# Patient Record
Sex: MALE | Race: BLACK OR AFRICAN AMERICAN | NOT HISPANIC OR LATINO | Employment: FULL TIME | URBAN - METROPOLITAN AREA
[De-identification: names, ages, dates, MRNs, and addresses within clinical notes are randomized per-mention and may not be internally consistent; named-entity substitution may affect disease eponyms.]

---

## 2017-03-03 ENCOUNTER — OFFICE VISIT (OUTPATIENT)
Dept: FAMILY MEDICINE | Facility: CLINIC | Age: 42
End: 2017-03-03
Payer: COMMERCIAL

## 2017-03-03 VITALS
TEMPERATURE: 98 F | WEIGHT: 220 LBS | HEART RATE: 75 BPM | SYSTOLIC BLOOD PRESSURE: 136 MMHG | DIASTOLIC BLOOD PRESSURE: 84 MMHG | OXYGEN SATURATION: 98 %

## 2017-03-03 DIAGNOSIS — Z13.220 SCREENING CHOLESTEROL LEVEL: ICD-10-CM

## 2017-03-03 DIAGNOSIS — R03.0 ELEVATED BLOOD PRESSURE READING WITHOUT DIAGNOSIS OF HYPERTENSION: ICD-10-CM

## 2017-03-03 DIAGNOSIS — Z00.01 ENCOUNTER FOR ROUTINE ADULT HEALTH EXAMINATION WITH ABNORMAL FINDINGS: Primary | ICD-10-CM

## 2017-03-03 DIAGNOSIS — Z23 IMMUNIZATION DUE: ICD-10-CM

## 2017-03-03 DIAGNOSIS — F34.1 DYSTHYMIA: ICD-10-CM

## 2017-03-03 DIAGNOSIS — Z13.1 SCREENING FOR DIABETES MELLITUS: ICD-10-CM

## 2017-03-03 LAB
CHOLEST SERPL-MCNC: 168 MG/DL
GLUCOSE SERPL-MCNC: 92 MG/DL (ref 70–99)

## 2017-03-03 PROCEDURE — 82465 ASSAY BLD/SERUM CHOLESTEROL: CPT | Performed by: PHYSICIAN ASSISTANT

## 2017-03-03 PROCEDURE — 90471 IMMUNIZATION ADMIN: CPT | Performed by: PHYSICIAN ASSISTANT

## 2017-03-03 PROCEDURE — 82947 ASSAY GLUCOSE BLOOD QUANT: CPT | Performed by: PHYSICIAN ASSISTANT

## 2017-03-03 PROCEDURE — 99395 PREV VISIT EST AGE 18-39: CPT | Mod: 25 | Performed by: PHYSICIAN ASSISTANT

## 2017-03-03 PROCEDURE — 90715 TDAP VACCINE 7 YRS/> IM: CPT | Performed by: PHYSICIAN ASSISTANT

## 2017-03-03 PROCEDURE — 36415 COLL VENOUS BLD VENIPUNCTURE: CPT | Performed by: PHYSICIAN ASSISTANT

## 2017-03-03 RX ORDER — CITALOPRAM HYDROBROMIDE 20 MG/1
TABLET ORAL
Qty: 30 TABLET | Refills: 1 | Status: SHIPPED | OUTPATIENT
Start: 2017-03-03 | End: 2017-05-25

## 2017-03-03 ASSESSMENT — ANXIETY QUESTIONNAIRES
7. FEELING AFRAID AS IF SOMETHING AWFUL MIGHT HAPPEN: SEVERAL DAYS
5. BEING SO RESTLESS THAT IT IS HARD TO SIT STILL: NOT AT ALL
6. BECOMING EASILY ANNOYED OR IRRITABLE: SEVERAL DAYS
1. FEELING NERVOUS, ANXIOUS, OR ON EDGE: MORE THAN HALF THE DAYS
3. WORRYING TOO MUCH ABOUT DIFFERENT THINGS: SEVERAL DAYS
2. NOT BEING ABLE TO STOP OR CONTROL WORRYING: SEVERAL DAYS
GAD7 TOTAL SCORE: 8
IF YOU CHECKED OFF ANY PROBLEMS ON THIS QUESTIONNAIRE, HOW DIFFICULT HAVE THESE PROBLEMS MADE IT FOR YOU TO DO YOUR WORK, TAKE CARE OF THINGS AT HOME, OR GET ALONG WITH OTHER PEOPLE: SOMEWHAT DIFFICULT

## 2017-03-03 ASSESSMENT — PATIENT HEALTH QUESTIONNAIRE - PHQ9: 5. POOR APPETITE OR OVEREATING: MORE THAN HALF THE DAYS

## 2017-03-03 NOTE — PROGRESS NOTES
SUBJECTIVE:     CC: Yobany Vieira is an 41 year old male who presents for preventative health visit.     Healthy Habits:    Do you get at least three servings of calcium containing foods daily (dairy, green leafy vegetables, etc.)? yes and no, taking calcium and/or vitamin D supplement: no    Amount of exercise or daily activities, outside of work: 1 day(s) per week    Problems taking medications regularly No    Medication side effects: No    Have you had an eye exam in the past two years? no    Do you see a dentist twice per year? yes    Do you have sleep apnea, excessive snoring or daytime drowsiness?no        Yobany would like a refill on celexa.    Today's PHQ-2 Score:   PHQ-2 ( 1999 Pfizer) 3/3/2017 8/31/2015   Q1: Little interest or pleasure in doing things 0 2   Q2: Feeling down, depressed or hopeless 0 3   PHQ-2 Score 0 5       Abuse: Current or Past(Physical, Sexual or Emotional)- No  Do you feel safe in your environment - Yes    Social History   Substance Use Topics     Smoking status: Current Every Day Smoker     Packs/day: 0.50     Years: 1.00     Smokeless tobacco: Never Used     Alcohol use 0.0 oz/week     0 Standard drinks or equivalent per week      Comment: social use     The patient does not drink >3 drinks per day nor >7 drinks per week.    Last PSA: No results found for: PSA    No results for input(s): CHOL, HDL, LDL, TRIG, CHOLHDLRATIO, NHDL in the last 89285 hours.    Reviewed orders with patient. Reviewed health maintenance and updated orders accordingly - Yes    Reviewed and updated as needed this visit by clinical staff  Tobacco  Allergies  Meds  Problems  Med Hx  Surg Hx  Fam Hx  Soc Hx          Reviewed and updated as needed this visit by Provider  Tobacco  Allergies  Meds  Problems  Med Hx  Surg Hx  Fam Hx  Soc Hx           Past Medical History   Diagnosis Date     Depressive disorder       History reviewed. No pertinent past surgical history.    ROS:  C: NEGATIVE for  fever, chills, change in weight  I: NEGATIVE for worrisome rashes, moles or lesions  E: NEGATIVE for vision changes or irritation  ENT: NEGATIVE for ear, mouth and throat problems  R: NEGATIVE for significant cough or SOB  CV: NEGATIVE for chest pain, palpitations or peripheral edema  GI: NEGATIVE for nausea, abdominal pain, heartburn, or change in bowel habits   male: negative for dysuria, hematuria, decreased urinary stream, erectile dysfunction, urethral discharge  M: NEGATIVE for significant arthralgias or myalgia  N: NEGATIVE for weakness, dizziness or paresthesias  P: NEGATIVE for changes in mood or affect    Problem list, Medication list, Allergies, and Medical/Social/Surgical histories reviewed in [facility] and updated as appropriate.  BP Readings from Last 3 Encounters:   03/03/17 136/84   08/31/15 (!) 134/100    Wt Readings from Last 3 Encounters:   03/03/17 220 lb (99.8 kg)   08/31/15 232 lb 12.8 oz (105.6 kg)                  OBJECTIVE:     /84  Pulse 75  Temp 98  F (36.7  C) (Oral)  Wt 220 lb (99.8 kg)  SpO2 98%  EXAM:  GENERAL: healthy, alert and no distress  EYES: Eyes grossly normal to inspection, PERRL and conjunctivae and sclerae normal  HENT: ear canals and TM's normal, nose and mouth without ulcers or lesions  NECK: no adenopathy, no asymmetry, masses, or scars and thyroid normal to palpation  RESP: lungs clear to auscultation - no rales, rhonchi or wheezes  CV: regular rate and rhythm, normal S1 S2, no S3 or S4, no murmur, click or rub, no peripheral edema and peripheral pulses strong  ABDOMEN: soft, nontender, no hepatosplenomegaly, no masses and bowel sounds normal  MS: no gross musculoskeletal defects noted, no edema  SKIN: no suspicious lesions or rashes  NEURO: Normal strength and tone, mentation intact and speech normal  PSYCH: mentation appears normal, affect normal/bright    ASSESSMENT/PLAN:         ICD-10-CM    1. Encounter for routine adult health examination with abnormal findings  Z00.01    2. Dysthymia F34.1 DEPRESSION ACTION PLAN (DAP)     citalopram (CELEXA) 20 MG tablet   3. Elevated blood pressure reading without diagnosis of hypertension R03.0    4. Screening for diabetes mellitus Z13.1 Glucose   5. Screening cholesterol level Z13.220 Cholesterol   6. Immunization due Z23 TDAP (ADACEL AGES 11-64)       COUNSELING:  Reviewed preventive health counseling, as reflected in patient instructions       Regular exercise       Healthy diet/nutrition         reports that he has been smoking.  He has a 0.50 pack-year smoking history. He has never used smokeless tobacco.  Tobacco Cessation Action Plan: Information offered: Patient not interested at this time  There is no height or weight on file to calculate BMI.   Weight management plan: Discussed healthy diet and exercise guidelines and patient will follow up in 12 months in clinic to re-evaluate.    Counseling Resources:  ATP IV Guidelines  Pooled Cohorts Equation Calculator  FRAX Risk Assessment  ICSI Preventive Guidelines  Dietary Guidelines for Americans, 2010  USDA's MyPlate  ASA Prophylaxis  Lung CA Screening    Hesham Jimenez PA-C  [facility]

## 2017-03-03 NOTE — LETTER
[facility]  [address]  [place] [address]  [phone]        March 13, 2017      Yobany Vieira  [address]  [place] [address]        Dear Yobany,      Your blood sugar and cholesterol numbers came back nice and normal.     Results for orders placed or performed in visit on 03/03/17   Cholesterol   Result Value Ref Range    Cholesterol 168 <200 mg/dL   Glucose   Result Value Ref Range    Glucose 92 70 - 99 mg/dL     If you have any questions or concerns, please call myself or my nurse at [phone].    Sincerely,    Hesham Jimenez PA-C/amrit

## 2017-03-03 NOTE — MR AVS SNAPSHOT
After Visit Summary   3/3/2017    Yobany Vieira    MRN: [id number]           Patient Information     Date Of Birth          1975        Visit Information        Provider Department      3/3/2017 4:20 PM Hesham Jimenez PA-C [facility]        Today's Diagnoses     Encounter for routine adult health examination with abnormal findings    -  1    Dysthymia        Elevated blood pressure reading without diagnosis of hypertension        Screening for diabetes mellitus        Screening cholesterol level        Immunization due          Care Instructions      Preventive Health Recommendations  Male Ages 26 - 39    Yearly exam:             See your health care provider every year in order to  o   Review health changes.   o   Discuss preventive care.    o   Review your medicines if your doctor has prescribed any.    You should be tested each year for STDs (sexually transmitted diseases), if you re at risk.     After age 35, talk to your provider about cholesterol testing. If you are at risk for heart disease, have your cholesterol tested at least every 5 years.     If you are at risk for diabetes, you should have a diabetes test (fasting glucose).  Shots: Get a flu shot each year. Get a tetanus shot every 10 years.     Nutrition:    Eat at least 5 servings of fruits and vegetables daily.     Eat whole-grain bread, whole-wheat pasta and brown rice instead of white grains and rice.     Talk to your provider about Calcium and Vitamin D.     Lifestyle    Exercise for at least 150 minutes a week (30 minutes a day, 5 days a week). This will help you control your weight and prevent disease.     Limit alcohol to one drink per day.     No smoking.     Wear sunscreen to prevent skin cancer.     See your dentist every six months for an exam and cleaning.           Follow-ups after your visit        Who to contact     Normal or non-critical lab and imaging results will be communicated to you by  "MyChart, letter or phone within 4 business days after the clinic has received the results. If you do not hear from us within 7 days, please contact the clinic through [employer] or phone. If you have a critical or abnormal lab result, we will notify you by phone as soon as possible.  Submit refill requests through [employer] or call your pharmacy and they will forward the refill request to us. Please allow 3 business days for your refill to be completed.          If you need to speak with a  for additional information , please call: [phone]             Additional Information About Your Visit        [employer] Information     [employer] lets you send messages to your doctor, view your test results, renew your prescriptions, schedule appointments and more. To sign up, go to [email or web address] . Click on \"Log in\" on the left side of the screen, which will take you to the Welcome page. Then click on \"Sign up Now\" on the right side of the page.     You will be asked to enter the access code listed below, as well as some personal information. Please follow the directions to create your username and password.     Your access code is: [email or web address]  Expires: 2017  4:42 PM     Your access code will  in 90 days. If you need help or a new code, please call your [facility] clinic or [phone].        Care EveryWhere ID     This is your Care EveryWhere ID. This could be used by other organizations to access your [facility] medical records  [id number]        Your Vitals Were     Pulse Temperature Pulse Oximetry             75 98  F (36.7  C) (Oral) 98%          Blood Pressure from Last 3 Encounters:   17 136/84   08/31/15 (!) 134/100    Weight from Last 3 Encounters:   17 220 lb (99.8 kg)   08/31/15 232 lb 12.8 oz (105.6 kg)              We Performed the Following     Cholesterol     DEPRESSION ACTION PLAN (DAP)     Glucose     TDAP (ADACEL AGES 11-64)          Where to get your medicines    "   These medications were sent to [facility] - [place], [place] - [address]  [address], [place] [address]     Phone:  [phone]     citalopram 20 MG tablet          Primary Care Provider Office Phone # Fax #    Hesham Nuñezfaith Jimenez PA-C [phone]       [facility] [address]  [place] [address]        Thank you!     Thank you for choosing [facility]  for your care. Our goal is always to provide you with excellent care. Hearing back from our patients is one way we can continue to improve our services. Please take a few minutes to complete the written survey that you may receive in the mail after your visit with us. Thank you!             Your Updated Medication List - Protect others around you: Learn how to safely use, store and throw away your medicines at www.disposemymeds.org.          This list is accurate as of: 3/3/17  4:43 PM.  Always use your most recent med list.                   Brand Name Dispense Instructions for use    citalopram 20 MG tablet    celeXA    30 tablet    Take 1/2 tablet (10 mg) for 1 week, then increase to 1 tablet orally daily

## 2017-03-03 NOTE — NURSING NOTE
Chief Complaint   Patient presents with     Physical       Initial /75  Pulse 75  Temp 98  F (36.7  C) (Oral)  Wt 220 lb (99.8 kg)  SpO2 98% There is no height or weight on file to calculate BMI.  Medication Reconciliation: complete     Kj Valadez CMA

## 2017-03-04 ASSESSMENT — ANXIETY QUESTIONNAIRES: GAD7 TOTAL SCORE: 8

## 2017-03-04 ASSESSMENT — PATIENT HEALTH QUESTIONNAIRE - PHQ9: SUM OF ALL RESPONSES TO PHQ QUESTIONS 1-9: 8

## 2017-05-25 DIAGNOSIS — F34.1 DYSTHYMIA: ICD-10-CM

## 2017-05-25 NOTE — TELEPHONE ENCOUNTER
citalopram     Last Written Prescription Date: 04/13/17  Last Fill Quantity: 30, # refills: 0  [place] Office Visit with [facility] [place] provider:  03/03/17        Last PHQ-9 score on record=   PHQ-9 SCORE 3/3/2017   Total Score 8

## 2017-05-26 NOTE — TELEPHONE ENCOUNTER
Left message on voice mail for patient to call clinic. [phone]  Need to verify current dose he is taking.  Jacque Casas RN

## 2017-06-01 NOTE — TELEPHONE ENCOUNTER
Spoke with patient and he has increased to 1 tab daily, but has been out of medication for about 3-4 days.  Patient is not sure if the medication is helping enough, but unable to make an appt for a few weeks.   Asking for a 1 month supply and he will schedule follow up appt  Script pended for approval with current dosing.  Jacque Casas RN

## 2017-06-02 RX ORDER — CITALOPRAM HYDROBROMIDE 20 MG/1
20 TABLET ORAL DAILY
Qty: 30 TABLET | Refills: 0 | Status: SHIPPED | OUTPATIENT
Start: 2017-06-02 | End: 2020-10-28 | Stop reason: ALTCHOICE

## 2017-10-09 ENCOUNTER — OFFICE VISIT - HEALTHEAST (OUTPATIENT)
Dept: INTERNAL MEDICINE | Facility: CLINIC | Age: 42
End: 2017-10-09

## 2017-10-09 DIAGNOSIS — M54.50 CHRONIC BILATERAL LOW BACK PAIN WITHOUT SCIATICA: ICD-10-CM

## 2017-10-09 DIAGNOSIS — G89.29 CHRONIC BILATERAL LOW BACK PAIN WITHOUT SCIATICA: ICD-10-CM

## 2017-10-09 DIAGNOSIS — M79.2 NEUROPATHIC PAIN, LEG, RIGHT: ICD-10-CM

## 2017-10-09 DIAGNOSIS — Z80.0 FAMILY HISTORY OF COLON CANCER: ICD-10-CM

## 2017-10-10 ENCOUNTER — COMMUNICATION - HEALTHEAST (OUTPATIENT)
Dept: INTERNAL MEDICINE | Facility: CLINIC | Age: 42
End: 2017-10-10

## 2019-07-03 ENCOUNTER — OFFICE VISIT - HEALTHEAST (OUTPATIENT)
Dept: FAMILY MEDICINE | Facility: CLINIC | Age: 44
End: 2019-07-03

## 2019-07-03 ENCOUNTER — COMMUNICATION - HEALTHEAST (OUTPATIENT)
Dept: TELEHEALTH | Facility: CLINIC | Age: 44
End: 2019-07-03

## 2019-07-03 ENCOUNTER — COMMUNICATION - HEALTHEAST (OUTPATIENT)
Dept: INTERNAL MEDICINE | Facility: CLINIC | Age: 44
End: 2019-07-03

## 2019-07-03 DIAGNOSIS — I10 ESSENTIAL HYPERTENSION: ICD-10-CM

## 2019-07-03 DIAGNOSIS — Z00.00 ROUTINE HISTORY AND PHYSICAL EXAMINATION OF ADULT: ICD-10-CM

## 2019-07-03 DIAGNOSIS — E66.812 CLASS 2 OBESITY WITHOUT SERIOUS COMORBIDITY WITH BODY MASS INDEX (BMI) OF 38.0 TO 38.9 IN ADULT, UNSPECIFIED OBESITY TYPE: ICD-10-CM

## 2019-07-03 LAB
ALBUMIN SERPL-MCNC: 4.1 G/DL (ref 3.5–5)
ALP SERPL-CCNC: 68 U/L (ref 45–120)
ALT SERPL W P-5'-P-CCNC: 34 U/L (ref 0–45)
ANION GAP SERPL CALCULATED.3IONS-SCNC: 10 MMOL/L (ref 5–18)
AST SERPL W P-5'-P-CCNC: 27 U/L (ref 0–40)
ATRIAL RATE - MUSE: 92 BPM
BILIRUB SERPL-MCNC: 0.6 MG/DL (ref 0–1)
BUN SERPL-MCNC: 12 MG/DL (ref 8–22)
CALCIUM SERPL-MCNC: 10 MG/DL (ref 8.5–10.5)
CHLORIDE BLD-SCNC: 102 MMOL/L (ref 98–107)
CHOLEST SERPL-MCNC: 167 MG/DL
CO2 SERPL-SCNC: 24 MMOL/L (ref 22–31)
CREAT SERPL-MCNC: 1.2 MG/DL (ref 0.7–1.3)
DIASTOLIC BLOOD PRESSURE - MUSE: NORMAL MMHG
FASTING STATUS PATIENT QL REPORTED: YES
GFR SERPL CREATININE-BSD FRML MDRD: >60 ML/MIN/1.73M2
GLUCOSE BLD-MCNC: 106 MG/DL (ref 70–125)
HDLC SERPL-MCNC: 56 MG/DL
INTERPRETATION ECG - MUSE: NORMAL
LDLC SERPL CALC-MCNC: 72 MG/DL
P AXIS - MUSE: 50 DEGREES
POTASSIUM BLD-SCNC: 3.9 MMOL/L (ref 3.5–5)
PR INTERVAL - MUSE: 122 MS
PROT SERPL-MCNC: 8 G/DL (ref 6–8)
QRS DURATION - MUSE: 76 MS
QT - MUSE: 360 MS
QTC - MUSE: 445 MS
R AXIS - MUSE: 14 DEGREES
SODIUM SERPL-SCNC: 136 MMOL/L (ref 136–145)
SYSTOLIC BLOOD PRESSURE - MUSE: NORMAL MMHG
T AXIS - MUSE: 114 DEGREES
TRIGL SERPL-MCNC: 193 MG/DL
VENTRICULAR RATE- MUSE: 92 BPM

## 2019-07-03 ASSESSMENT — MIFFLIN-ST. JEOR: SCORE: 1960.41

## 2020-01-13 ENCOUNTER — COMMUNICATION - HEALTHEAST (OUTPATIENT)
Dept: SCHEDULING | Facility: CLINIC | Age: 45
End: 2020-01-13

## 2020-10-28 NOTE — PROGRESS NOTES
"Yobany Vieira is a 45 year old male who is being evaluated via a billable telephone visit.      The patient has been notified of following:     \"This telephone visit will be conducted via a call between you and your physician/provider. We have found that certain health care needs can be provided without the need for a physical exam.  This service lets us provide the care you need with a short phone conversation.  If a prescription is necessary we can send it directly to your pharmacy.  If lab work is needed we can place an order for that and you can then stop by our lab to have the test done at a later time.    Telephone visits are billed at different rates depending on your insurance coverage. During this emergency period, for some insurers they may be billed the same as an in-person visit.  Please reach out to your insurance provider with any questions.    If during the course of the call the physician/provider feels a telephone visit is not appropriate, you will not be charged for this service.\"    Patient has given verbal consent for Telephone visit?  Yes    What phone number would you like to be contacted at? [phone]    How would you like to obtain your AVS? Mail a copy    Subjective     Yobany Vieira is a 45 year old male who presents via phone visit today for the following health issues:    HPI     Genitourinary - Male  Onset/Duration: 4 days   Description:   Dysuria (painful urination): no}  Hematuria (blood in urine): no  Frequency: YES  Waking at night to urinate: YES  Hesitancy (delay in urine): no  Retention (unable to empty): no  Decrease in urinary flow: no  Incontinence: no  Progression of Symptoms:  intermittent  Accompanying Signs & Symptoms:  Fever: no  Back/Flank pain: no  Urethral discharge: no  Testicle lumps/masses/pain: no  Nausea and/or vomiting: no  Abdominal pain: no  History:   History of frequent UTI s: no  History of kidney stones: no  History of hernias: no  Personal or " "Family history of Prostate problems: no  Sexually active: YES    Therapies tried and outcome: none - Pt had intercourse with ex girlfriend Friday and started feeling a tingling sensation Saturday. Pt reports no pain or rash.     {PEDS Chronic and Acute Problems (Optional):245861}     {additonal problems for provider to add (Optional):966010}    Review of Systems   {ROS COMP (Optional):078882}       Objective          Vitals:  No vitals were obtained today due to virtual visit.    {GENERAL APPEARANCE:50::\"healthy\",\"alert\",\"no distress\"}  PSYCH: Alert and oriented times 3; coherent speech, normal   rate and volume, able to articulate logical thoughts, able   to abstract reason, no tangential thoughts, no hallucinations   or delusions  His affect is { :8117776::\"normal\"}  RESP: No cough, no audible wheezing, able to talk in full sentences  Remainder of exam unable to be completed due to telephone visits    {Diagnostic Test Results (Optional):869289}        Assessment/Plan:    {PROVIDER CHARTING PREFERENCE SOAPO:616297}    Phone call duration:  *** minutes                "

## 2020-10-29 ENCOUNTER — VIRTUAL VISIT (OUTPATIENT)
Dept: FAMILY MEDICINE | Facility: CLINIC | Age: 45
End: 2020-10-29
Payer: COMMERCIAL

## 2020-10-29 DIAGNOSIS — R30.0 DYSURIA: Primary | ICD-10-CM

## 2020-10-29 PROCEDURE — 99213 OFFICE O/P EST LOW 20 MIN: CPT | Mod: TEL | Performed by: PHYSICIAN ASSISTANT

## 2020-10-29 RX ORDER — AZITHROMYCIN 500 MG/1
1000 TABLET, FILM COATED ORAL DAILY
Qty: 2 TABLET | Refills: 0 | Status: SHIPPED | OUTPATIENT
Start: 2020-10-29 | End: 2020-10-30

## 2020-10-29 RX ORDER — CEFIXIME 400 MG/1
400 CAPSULE ORAL ONCE
Qty: 1 CAPSULE | Refills: 0 | Status: SHIPPED | OUTPATIENT
Start: 2020-10-29 | End: 2020-10-29

## 2020-10-29 NOTE — PATIENT INSTRUCTIONS
Lourdes Haywood,    Thank you for allowing [facility] to manage your care.    I sent your prescriptions to your pharmacy.    Please allow 1-2 business days for our office to contact you in regards to your laboratory/radiological studies.  If not done so, I encourage you to login into [employer] ([email or web address] to review your results as well.     If you have any questions or concerns, please feel free to call us at [phone]    Sincerely,    Cameron Horowitz PA-C    Did you know?      You can schedule a video visit for follow-up appointments as well as future appointments for certain conditions.  Please see the below link.     [email or web address]    If you have not already done so,  I encourage you to sign up for [employer] ([email or web address]).  This will allow you to review your results, securely communicate with a provider, and schedule virtual visits as well.      Patient Education     Dysuria with Uncertain Cause (Adult)    The urethra is the tube that allows urine to pass out of the body. In a woman, the urethra is the opening above the vagina. In men, the urethra is the opening on the tip of the penis. Dysuria is the feeling of pain or burning in the urethra when passing urine.   Dysuria can be caused by anything that irritates or inflames the urethra. An infection or chemical irritation can cause this reaction. A bladder infection is the most common cause of dysuria in adults. A urine test can diagnose this. A bladder infection needs antibiotic treatment.   Soaps, lotions, colognes, and feminine hygiene products can cause dysuria. So can birth control jellies, creams, and foams. It will go away 1 to 3 days after using these irritants.   Sexually transmitted infections (STIs) such as chlamydia or gonorrhea can cause dysuria. Your healthcare provider may take a culture sample. Your provider may start you on antibiotic medicine before  the culture test returns.   In women who have gone through menopause, dysuria can be from dryness in the lining of the urethra. This can be treated with hormones. Dysuria becomes long-term (chronic) when it lasts for weeks or months. You may need to see a specialist (urologist) to diagnose and treat chronic dysuria.   Home care  These home care tips may help:    Don't use any chemicals or products that you think may be causing your symptoms.    If you were given a prescription medicine, take as directed. Take it until it is all used up.    If a culture was taken, don't have sex until you have been told that it is negative. A negative culture means you don't have an infection. Then follow your healthcare provider's advice to treat your condition.  If a culture was done and it is positive:     Both you and your sexual partner may need to be treated. This is true even if your partner has no symptoms.    Contact your healthcare provider or go to an urgent care clinic or the public health department to be looked at and treated.    Don't have sex until both you and your partner have finished all antibiotics and your healthcare provider says you are no longer contagious.    Learn about and use safe sex practices. The safest sex is with a partner who has tested negative and only has sex with you. Condoms can prevent STIs from spreading, but they aren't a guarantee.  Follow-up care  Follow up with your healthcare provider, or as advised. If a culture was taken, you may call as directed for the results. If you have an STI, follow up with your provider or the public health department for a complete STI screening, including HIV testing. For more information, contact CDC-INFO at 088-996-6812.   When to get medical advice  Call your healthcare provider right away if any of these occur:    You aren't better after 3 days of treatment    Fever of 100.4 F (38 C) or higher, or as directed by your provider    Back or belly pain that  gets worse    You can't urinate because of pain    New discharge from the urethra, vagina, or penis    Painful sores on the penis    Rash or joint pain    Painful lumps (lymph nodes) in the groin    Testicle pain or swelling of the scrotum  [employer] last reviewed this educational content on 10/1/2019    5830-2000 [employer]. [address] [place] [address]. All rights reserved. This information is not intended as a substitute for professional medical care. Always follow your healthcare professional's instructions.

## 2020-10-29 NOTE — PROGRESS NOTES
"Yobany Vieira is a 45 year old male who is being evaluated via a billable telephone visit.      The patient has been notified of following:     \"This telephone visit will be conducted via a call between you and your physician/provider. We have found that certain health care needs can be provided without the need for a physical exam.  This service lets us provide the care you need with a short phone conversation.  If a prescription is necessary we can send it directly to your pharmacy.  If lab work is needed we can place an order for that and you can then stop by our lab to have the test done at a later time.    Telephone visits are billed at different rates depending on your insurance coverage. During this emergency period, for some insurers they may be billed the same as an in-person visit.  Please reach out to your insurance provider with any questions.    If during the course of the call the physician/provider feels a telephone visit is not appropriate, you will not be charged for this service.\"    Patient has given verbal consent for Telephone visit?  Yes    What phone number would you like to be contacted at? [phone]    How would you like to obtain your AVS? Mail a copy    Subjective     Yobany Vieira is a 45 year old male who presents via phone visit today for the following health issues:    HPI    Pt had intercourse with ex girlfriend Friday and started feeling a tingling sensation Saturday. Pt reports no pain or rash.     Genitourinary - Male  Onset/Duration: 4 days   Description:   Dysuria (painful urination): no}  Hematuria (blood in urine): no  Frequency: YES  Waking at night to urinate: YES  Hesitancy (delay in urine): no  Retention (unable to empty): no  Decrease in urinary flow: no  Incontinence: no  Progression of Symptoms:  intermittent  Accompanying Signs & Symptoms:  Fever: no  Back/Flank pain: no  Urethral discharge: no  Testicle lumps/masses/pain: no  Nausea and/or vomiting: " no  Abdominal pain: no  History:   History of frequent UTI s: no  History of kidney stones: no  History of hernias: no  Personal or Family history of Prostate problems: no  Sexually active: YES    Therapies tried and outcome: none    Review of Systems   Constitutional, HEENT, cardiovascular, pulmonary, gi and gu systems are negative, except as otherwise noted.       Objective          Vitals:  No vitals were obtained today due to virtual visit.    healthy, alert and no distress  PSYCH: Alert and oriented times 3; coherent speech, normal   rate and volume, able to articulate logical thoughts, able   to abstract reason, no tangential thoughts, no hallucinations   or delusions  His affect is normal and pleasant  RESP: No cough, no audible wheezing, able to talk in full sentences  Remainder of exam unable to be completed due to telephone visits    UA, GC/chlamydia pending.    Assessment & Plan   Problem List Items Addressed This Visit     None      Visit Diagnoses     Dysuria    -  Primary    Relevant Medications    azithromycin (ZITHROMAX) 500 MG tablet    cefixime (SUPRAX) 400 MG capsule    Other Relevant Orders    *UA reflex to Microscopic and Culture ([facility] (except [place] and [place])    CHLAMYDIA TRACHOMATIS PCR    NEISSERIA GONORRHOEA PCR         Pt is a 45 year old male here with dysuria and urethral discharge. Unprotected vaginal sex with a partner with new abnormal sensation in glans of penis.     Denies fevers, abdominal/flank pain, and vomiting. UA and GC-chlamydia pending. Will treat with azithromycin and ceftriaxone empirically, though chlamydia and gonorrhea testing is pending. He is not interested in HIV, HSV and syphilis screening. FU as needed. If symptoms worsen, develop back pain/fevers/vomiting go to ER for further treatment. Counseled on safe sex and condom use.     DDx and Dx discussed with and explained to the pt to their satisfaction.  All questions were answered at this  time. Pt expressed understanding of and agreement with this dx, tx, and plan. No further workup warranted and standard medication warnings given. I have given the patient a list of pertinent indications for re-evaluation. Will go to the Emergency Department if symptoms worsen or new concerning symptoms arise. Patient left the call in no apparent distress.     See Patient Instructions  Return if symptoms worsen or fail to improve.    CLAUDETTE Reyes  [facility]    Phone call duration:  8 minutes

## 2021-04-14 ENCOUNTER — OFFICE VISIT (OUTPATIENT)
Dept: FAMILY MEDICINE | Facility: CLINIC | Age: 46
End: 2021-04-14
Payer: COMMERCIAL

## 2021-04-14 VITALS
HEART RATE: 96 BPM | OXYGEN SATURATION: 98 % | HEIGHT: 67 IN | SYSTOLIC BLOOD PRESSURE: 160 MMHG | BODY MASS INDEX: 37.83 KG/M2 | TEMPERATURE: 97.8 F | DIASTOLIC BLOOD PRESSURE: 108 MMHG | WEIGHT: 241 LBS

## 2021-04-14 DIAGNOSIS — Z13.220 SCREENING FOR HYPERLIPIDEMIA: ICD-10-CM

## 2021-04-14 DIAGNOSIS — Z13.1 SCREENING FOR DIABETES MELLITUS: ICD-10-CM

## 2021-04-14 DIAGNOSIS — F34.1 DYSTHYMIA: ICD-10-CM

## 2021-04-14 DIAGNOSIS — Z11.59 ENCOUNTER FOR HEPATITIS C SCREENING TEST FOR LOW RISK PATIENT: ICD-10-CM

## 2021-04-14 DIAGNOSIS — N52.9 DIFFICULTY ATTAINING ERECTION: ICD-10-CM

## 2021-04-14 DIAGNOSIS — Z00.00 ROUTINE GENERAL MEDICAL EXAMINATION AT A HEALTH CARE FACILITY: Primary | ICD-10-CM

## 2021-04-14 DIAGNOSIS — E66.01 MORBID OBESITY (H): ICD-10-CM

## 2021-04-14 DIAGNOSIS — F41.9 ANXIETY: ICD-10-CM

## 2021-04-14 DIAGNOSIS — I10 BENIGN ESSENTIAL HYPERTENSION: ICD-10-CM

## 2021-04-14 DIAGNOSIS — R07.81 RIB PAIN ON LEFT SIDE: ICD-10-CM

## 2021-04-14 DIAGNOSIS — F17.200 SMOKER: ICD-10-CM

## 2021-04-14 PROCEDURE — 99214 OFFICE O/P EST MOD 30 MIN: CPT | Mod: 25 | Performed by: NURSE PRACTITIONER

## 2021-04-14 PROCEDURE — 99396 PREV VISIT EST AGE 40-64: CPT | Performed by: NURSE PRACTITIONER

## 2021-04-14 RX ORDER — HYDROCHLOROTHIAZIDE 25 MG/1
25 TABLET ORAL
COMMUNITY
Start: 2019-07-03 | End: 2021-04-14

## 2021-04-14 RX ORDER — HYDROCHLOROTHIAZIDE 25 MG/1
25 TABLET ORAL DAILY
Qty: 90 TABLET | Refills: 0 | Status: SHIPPED | OUTPATIENT
Start: 2021-04-14 | End: 2021-07-26

## 2021-04-14 ASSESSMENT — ENCOUNTER SYMPTOMS
MYALGIAS: 0
FREQUENCY: 0
HEMATOCHEZIA: 0
JOINT SWELLING: 0
DIZZINESS: 0
ABDOMINAL PAIN: 0
HEADACHES: 0
NERVOUS/ANXIOUS: 1
DYSURIA: 0
COUGH: 0
ARTHRALGIAS: 0
DIARRHEA: 0
CONSTIPATION: 0
EYE PAIN: 0
NAUSEA: 0
HEARTBURN: 0
WEAKNESS: 0
HEMATURIA: 0
SHORTNESS OF BREATH: 0
SORE THROAT: 0
FEVER: 0
PARESTHESIAS: 0
PALPITATIONS: 0
CHILLS: 0

## 2021-04-14 ASSESSMENT — MIFFLIN-ST. JEOR: SCORE: 1946.8

## 2021-04-14 NOTE — PROGRESS NOTES
SUBJECTIVE:   CC: Yobany Vieira is an 45 year old male who presents for preventative health visit.       Patient has been advised of split billing requirements and indicates understanding: Yes  Healthy Habits:     Getting at least 3 servings of Calcium per day:  Yes    Bi-annual eye exam:  NO    Dental care twice a year:  Yes    Sleep apnea or symptoms of sleep apnea:  Excessive snoring    Diet:  Regular (no restrictions)    Frequency of exercise:  1 day/week    Duration of exercise:  30-45 minutes    Taking medications regularly:  Yes    Medication side effects:  Not applicable    PHQ-2 Total Score: 0    Additional concerns today:  Yes    Non fasting    Friend recently passed away from MI and another recently had a stroke. pts father has had 2 strokes. He admits to smoking since age 20, he smokes 3-4 cigarettes per day, he has quit cold turkey in the past for 5 years and plans to quit cold turkey again.     He states he has a gym membership but needs to start going. He was on a diet from November through January, he was down to 227lbs, he states he needs to work on diet again.     He reports intermittent left chest/rib pain and left shoulder pain. Denies injury to these areas. Denies rash to these area. He states the pain occurs at rest. Denies shortness of breath, palpitations, dizziness/light headedness, headaches and leg swelling. He reports a history of anxiety. hes previously been on celexa and states hes doing well off medication. He states pressing on painful area worsens the pain when its present.     He reports troubles with ED (troubles keeping an erection) for the past 3 years.     Hypertension Follow-up      Do you check your blood pressure regularly outside of the clinic? Trying too has cuff machine     Are you following a low salt diet? Trying too    Are your blood pressures ever more than 140 on the top number (systolic) OR more   than 90 on the bottom number (diastolic), for example 140/90? Yes      He states he was previously on hydrochlorothiazide and took it for about 35 days, he checked his BP once during that time and his BP was normal, he hasnt taken the medication for the past 1 year.     Today's PHQ-2 Score:   PHQ-2 ( 1999 Pf[employer]) 4/14/2021   Q1: Little interest or pleasure in doing things 0   Q2: Feeling down, depressed or hopeless 0   PHQ-2 Score 0   Q1: Little interest or pleasure in doing things Not at all   Q2: Feeling down, depressed or hopeless Not at all   PHQ-2 Score 0       Abuse: Current or Past(Physical, Sexual or Emotional)- No  Do you feel safe in your environment? Yes    Have you ever done Advance Care Planning? (For example, a Health Directive, POLST, or a discussion with a medical provider or your loved ones about your wishes): No, advance care planning information given to patient to review.  Patient declined advance care planning discussion at this time.    Social History     Tobacco Use     Smoking status: Current Every Day Smoker     Packs/day: 0.25     Years: 1.00     Pack years: 0.25     Smokeless tobacco: Never Used     Tobacco comment: 3-4 ciggs per day   Substance Use Topics     Alcohol use: Yes     Alcohol/week: 0.0 standard drinks     Comment: less than 7 per week.     Alcohol Use 4/14/2021   Prescreen: >3 drinks/day or >7 drinks/week? No   Prescreen: >3 drinks/day or >7 drinks/week? -       Last PSA: No results found for: PSA    Reviewed orders with patient. Reviewed health maintenance and updated orders accordingly - Yes      Reviewed and updated as needed this visit by clinical staff  Tobacco  Allergies   Problems  Med Hx  Surg Hx  Fam Hx  Soc Hx        Reviewed and updated as needed this visit by Provider     Problems              Review of Systems   Constitutional: Negative for chills and fever.   HENT: Negative for congestion, ear pain, hearing loss and sore throat.    Eyes: Negative for pain and visual disturbance.   Respiratory: Negative for cough and  "shortness of breath.    Cardiovascular: Positive for chest pain. Negative for palpitations and peripheral edema.   Gastrointestinal: Negative for abdominal pain, constipation, diarrhea, heartburn, hematochezia and nausea.   Genitourinary: Positive for impotence. Negative for discharge, dysuria, frequency, genital sores, hematuria and urgency.   Musculoskeletal: Negative for arthralgias, joint swelling and myalgias.   Skin: Negative for rash.   Neurological: Negative for dizziness, weakness, headaches and paresthesias.   Psychiatric/Behavioral: Negative for mood changes. The patient is nervous/anxious.      OBJECTIVE:   BP (!) 160/108 (BP Location: Right arm, Patient Position: Sitting, Cuff Size: Adult Large)   Pulse 96   Temp 97.8  F (36.6  C) (Tympanic)   Ht 1.702 m (5' 7\")   Wt 109.3 kg (241 lb)   SpO2 98%   BMI 37.75 kg/m      Physical Exam  GENERAL: healthy, alert and no distress  EYES: Eyes grossly normal to inspection, PERRL and conjunctivae and sclerae normal  HENT: ear canals and TM's normal, nose and mouth without ulcers or lesions  NECK: no adenopathy, no asymmetry, masses, or scars and thyroid normal to palpation  RESP: lungs clear to auscultation - no rales, rhonchi or wheezes  CV: regular rate and rhythm, normal S1 S2, no S3 or S4, no murmur, click or rub, no peripheral edema and peripheral pulses strong  ABDOMEN: soft, nontender, no hepatosplenomegaly, no masses and bowel sounds normal  MS: no gross musculoskeletal defects noted, no edema. No tenderness noted to left rib cage or left shoulder. Full ROM of LUE   SKIN: no suspicious lesions or rashes  NEURO: Normal strength and tone, mentation intact and speech normal  PSYCH: mentation appears normal, affect normal/bright      ASSESSMENT/PLAN:   1. Routine general medical examination at a health care facility  Pt declined vaccines today.   - CBC with platelets and differential; Future    2. Benign essential hypertension  Currently asymptomatic. Pt " has been off medications for the past year, previously on hydrochlorothiazide. Will re-start previous medication, pt to take 1/2 tablet for the first few days and then increase to a full tablet. Pt to monitor BP at home and return to clinic for nurse BP check in 2 weeks. Encouraged smoking cessation, encouraged low salt diet, encouraged regular exercise. Due to elevated BP, pt being a smoker and family history, will order CT coronary calcium scan today.   - hydrochlorothiazide (HYDRODIURIL) 25 MG tablet; Take 1 tablet (25 mg) by mouth daily  Dispense: 90 tablet; Refill: 0  - Comprehensive metabolic panel (BMP + Alb, Alk Phos, ALT, AST, Total. Bili, TP); Future  - CT Coronary Calcium Scan; Future  - OFFICE/OUTPT VISIT,EST,LEVL III    3. Difficulty attaining erection  Will check labs and further evaluate heart prior to prescribing ED medication.   - Lipid panel reflex to direct LDL Fasting; Future  - **A1C FUTURE anytime; Future  - CBC with platelets and differential; Future  - Comprehensive metabolic panel (BMP + Alb, Alk Phos, ALT, AST, Total. Bili, TP); Future  - TSH with free T4 reflex; Future  - OFFICE/OUTPT VISIT,EST,LEVL III    4. Screening for diabetes mellitus  - **A1C FUTURE anytime; Future    5. Screening for hyperlipidemia  - Lipid panel reflex to direct LDL Fasting; Future    6. Encounter for hepatitis C screening test for low risk patient  - **Hepatitis C Screen Reflex to RNA FUTURE anytime; Future    7. Rib pain on left side  Chest pain sounds to be musculoskeletal in nature, currently asymptomatic.   - OFFICE/OUTPT VISIT,EST,LEVL III    8. Smoker  Pt plans to quit cold turkey     9. Dysthymia  Stable off medications per report, currently declines need for therapy   - OFFICE/OUTPT VISIT,EST,LEVL III    10. Anxiety  Stable off medications per report, currently declines need for therapy   - OFFICE/OUTPT VISIT,EST,LEVL III    11. Morbid obesity (H)  Encouraged regular exercise and healthy diet  "      COUNSELING:   Reviewed preventive health counseling, as reflected in patient instructions    Estimated body mass index is 37.75 kg/m  as calculated from the following:    Height as of this encounter: 1.702 m (5' 7\").    Weight as of this encounter: 109.3 kg (241 lb).     Weight management plan: Discussed healthy diet and exercise guidelines    He reports that he has been smoking. He has a 0.25 pack-year smoking history. He has never used smokeless tobacco.  Tobacco Cessation Action Plan:   Information offered: Patient not interested at this time      Counseling Resources:  ATP IV Guidelines  Pooled Cohorts Equation Calculator  FRAX Risk Assessment  ICSI Preventive Guidelines  Dietary Guidelines for Americans, 2010  USDA's MyPlate  ASA Prophylaxis  Lung CA Screening    Susan Simpson NP  [facility]  "

## 2021-04-16 DIAGNOSIS — I10 BENIGN ESSENTIAL HYPERTENSION: ICD-10-CM

## 2021-04-16 DIAGNOSIS — Z13.220 SCREENING FOR HYPERLIPIDEMIA: ICD-10-CM

## 2021-04-16 DIAGNOSIS — Z00.00 ROUTINE GENERAL MEDICAL EXAMINATION AT A HEALTH CARE FACILITY: ICD-10-CM

## 2021-04-16 DIAGNOSIS — N52.9 DIFFICULTY ATTAINING ERECTION: ICD-10-CM

## 2021-04-16 DIAGNOSIS — Z11.59 ENCOUNTER FOR HEPATITIS C SCREENING TEST FOR LOW RISK PATIENT: ICD-10-CM

## 2021-04-16 DIAGNOSIS — Z13.1 SCREENING FOR DIABETES MELLITUS: ICD-10-CM

## 2021-04-16 LAB
ALBUMIN SERPL-MCNC: 4.1 G/DL (ref 3.4–5)
ALP SERPL-CCNC: 78 U/L (ref 40–150)
ALT SERPL W P-5'-P-CCNC: 30 U/L (ref 0–70)
ANION GAP SERPL CALCULATED.3IONS-SCNC: 4 MMOL/L (ref 3–14)
AST SERPL W P-5'-P-CCNC: 21 U/L (ref 0–45)
BASOPHILS # BLD AUTO: 0 10E9/L (ref 0–0.2)
BASOPHILS NFR BLD AUTO: 0.2 %
BILIRUB SERPL-MCNC: 0.4 MG/DL (ref 0.2–1.3)
BUN SERPL-MCNC: 9 MG/DL (ref 7–30)
CALCIUM SERPL-MCNC: 9.5 MG/DL (ref 8.5–10.1)
CHLORIDE SERPL-SCNC: 105 MMOL/L (ref 94–109)
CHOLEST SERPL-MCNC: 180 MG/DL
CO2 SERPL-SCNC: 28 MMOL/L (ref 20–32)
CREAT SERPL-MCNC: 1.15 MG/DL (ref 0.66–1.25)
DIFFERENTIAL METHOD BLD: NORMAL
EOSINOPHIL # BLD AUTO: 0.1 10E9/L (ref 0–0.7)
EOSINOPHIL NFR BLD AUTO: 2 %
ERYTHROCYTE [DISTWIDTH] IN BLOOD BY AUTOMATED COUNT: 14.6 % (ref 10–15)
GFR SERPL CREATININE-BSD FRML MDRD: 77 ML/MIN/{1.73_M2}
GLUCOSE SERPL-MCNC: 117 MG/DL (ref 70–99)
HBA1C MFR BLD: 5.9 % (ref 0–5.6)
HCT VFR BLD AUTO: 44.7 % (ref 40–53)
HDLC SERPL-MCNC: 66 MG/DL
HGB BLD-MCNC: 14.8 G/DL (ref 13.3–17.7)
LDLC SERPL CALC-MCNC: 83 MG/DL
LYMPHOCYTES # BLD AUTO: 1.4 10E9/L (ref 0.8–5.3)
LYMPHOCYTES NFR BLD AUTO: 30.6 %
MCH RBC QN AUTO: 31 PG (ref 26.5–33)
MCHC RBC AUTO-ENTMCNC: 33.1 G/DL (ref 31.5–36.5)
MCV RBC AUTO: 94 FL (ref 78–100)
MONOCYTES # BLD AUTO: 0.4 10E9/L (ref 0–1.3)
MONOCYTES NFR BLD AUTO: 8.8 %
NEUTROPHILS # BLD AUTO: 2.7 10E9/L (ref 1.6–8.3)
NEUTROPHILS NFR BLD AUTO: 58.4 %
NONHDLC SERPL-MCNC: 114 MG/DL
PLATELET # BLD AUTO: 304 10E9/L (ref 150–450)
POTASSIUM SERPL-SCNC: 3.5 MMOL/L (ref 3.4–5.3)
PROT SERPL-MCNC: 8.9 G/DL (ref 6.8–8.8)
RBC # BLD AUTO: 4.78 10E12/L (ref 4.4–5.9)
SODIUM SERPL-SCNC: 137 MMOL/L (ref 133–144)
TRIGL SERPL-MCNC: 157 MG/DL
TSH SERPL DL<=0.005 MIU/L-ACNC: 1.03 MU/L (ref 0.4–4)
WBC # BLD AUTO: 4.6 10E9/L (ref 4–11)

## 2021-04-16 PROCEDURE — 80061 LIPID PANEL: CPT | Performed by: NURSE PRACTITIONER

## 2021-04-16 PROCEDURE — 36415 COLL VENOUS BLD VENIPUNCTURE: CPT | Performed by: NURSE PRACTITIONER

## 2021-04-16 PROCEDURE — 83036 HEMOGLOBIN GLYCOSYLATED A1C: CPT | Performed by: NURSE PRACTITIONER

## 2021-04-16 PROCEDURE — 80050 GENERAL HEALTH PANEL: CPT | Performed by: NURSE PRACTITIONER

## 2021-04-16 PROCEDURE — 86803 HEPATITIS C AB TEST: CPT | Performed by: NURSE PRACTITIONER

## 2021-04-18 LAB — HCV AB SERPL QL IA: NONREACTIVE

## 2021-04-19 ENCOUNTER — TELEPHONE (OUTPATIENT)
Dept: FAMILY MEDICINE | Facility: CLINIC | Age: 46
End: 2021-04-19

## 2021-04-19 DIAGNOSIS — R73.03 PREDIABETES: ICD-10-CM

## 2021-04-19 DIAGNOSIS — E78.1 HYPERTRIGLYCERIDEMIA: Primary | ICD-10-CM

## 2021-04-19 NOTE — TELEPHONE ENCOUNTER
Pt called back. Reviewed results and recommendations. Pt verbalized an understanding.    Gregoria Gee RN

## 2021-04-19 NOTE — TELEPHONE ENCOUNTER
Please call pt with lab results. Triglycerides were slightly elevated, encourage pt to limit alcohol and to avoid fatty/fried/greasy foods. A1C and blood glucose levels are slightly elevated, indicating prediabetes. The rest of his labs look okay. I have placed a referral to diabetic educator to further discuss dietary changes with these abnormal labs.

## 2021-04-19 NOTE — TELEPHONE ENCOUNTER
Attempted to reach pt via phone. No answer, left estephaniesully to return call to [facility] at [phone] to review recent lab results. Also sent pt results and recommendation via letter.    Gregoria Gee RN

## 2021-04-19 NOTE — LETTER
[facility]  [id number]  [place] [address]    4/19/2021        Dear Yobany    I am writing you in regards to your recent lab results obtained at the clinic. Your tests have returned and are listed here.   Component      Latest Ref Rng & Units 4/16/2021   WBC      4.0 - 11.0 10e9/L 4.6   RBC Count      4.4 - 5.9 10e12/L 4.78   Hemoglobin      13.3 - 17.7 g/dL 14.8   Hematocrit      40.0 - 53.0 % 44.7   MCV      78 - 100 fl 94   MCH      26.5 - 33.0 pg 31.0   MCHC      31.5 - 36.5 g/dL 33.1   RDW      10.0 - 15.0 % 14.6   Platelet Count      150 - 450 10e9/L 304   % Neutrophils      % 58.4   % Lymphocytes      % 30.6   % Monocytes      % 8.8   % Eosinophils      % 2.0   % Basophils      % 0.2   Absolute Neutrophil      1.6 - 8.3 10e9/L 2.7   Absolute Lymphocytes      0.8 - 5.3 10e9/L 1.4   Absolute Monocytes      0.0 - 1.3 10e9/L 0.4   Absolute Eosinophils      0.0 - 0.7 10e9/L 0.1   Absolute Basophils      0.0 - 0.2 10e9/L 0.0   Diff Method       Automated Method   Sodium      133 - 144 mmol/L 137   Potassium      3.4 - 5.3 mmol/L 3.5   Chloride      94 - 109 mmol/L 105   Carbon Dioxide      20 - 32 mmol/L 28   Anion Gap      3 - 14 mmol/L 4   Glucose      70 - 99 mg/dL 117 (H)   Urea Nitrogen      7 - 30 mg/dL 9   Creatinine      0.66 - 1.25 mg/dL 1.15   GFR Estimate      >60 mL/min/1.73:m2 77   GFR Estimate If Black      >60 mL/min/1.73:m2 89   Calcium      8.5 - 10.1 mg/dL 9.5   Bilirubin Total      0.2 - 1.3 mg/dL 0.4   Albumin      3.4 - 5.0 g/dL 4.1   Protein Total      6.8 - 8.8 g/dL 8.9 (H)   Alkaline Phosphatase      40 - 150 U/L 78   ALT      0 - 70 U/L 30   AST      0 - 45 U/L 21   Cholesterol      <200 mg/dL 180   Triglycerides      <150 mg/dL 157 (H)   HDL Cholesterol      >39 mg/dL 66   LDL Cholesterol Calculated      <100 mg/dL 83   Non HDL Cholesterol      <130 mg/dL 114   Hepatitis C Antibody      NR:Nonreactive Nonreactive   Hemoglobin A1C      0 - 5.6 % 5.9 (H)   TSH      0.40 -  4.00 mU/L 1.03     Your Triglycerides (cholesterol level) are slightly elevated, I encourage you to limit alcohol intake and to avoid fatty/fried/greasy foods. A1C and blood glucose levels are slightly elevated, indicating prediabetes. The rest of your labs look okay. I have placed a referral to the diabetic educator to further discuss dietary changes with these abnormal labs. They will contact you in the next few days to schedule an appointment.      Sincerely,    Susan Simpson NP  [address] [id number] [phone]

## 2021-04-22 ENCOUNTER — TELEPHONE (OUTPATIENT)
Dept: FAMILY MEDICINE | Facility: CLINIC | Age: 46
End: 2021-04-22

## 2021-04-22 NOTE — TELEPHONE ENCOUNTER
Diabetes Education Scheduling Outreach #1:    Call to patient to schedule. Patient would like materials mailed to him because insurance does not cover. Will have materials mailed to patient.    Augusta Lopez  [facility]  Diabetes and Nutrition Scheduling

## 2021-05-30 NOTE — PROGRESS NOTES
Assessment/Plan:        1. Routine history and physical examination of adult  - Comprehensive Metabolic Panel  - Lipid Profile    2. Essential hypertension  Exam findings were discussed with the patient  Plan to start a new antihypertensive medication    Start  - hydroCHLOROthiazide (HYDRODIURIL) 25 MG tablet; Take 1 tablet (25 mg total) by mouth daily.  Dispense: 30 tablet; Refill: 0  Keep a log of blood pressure readings    - Electrocardiogram Perform and Read-normal  Recheck in a month    3. Class 2 obesity without serious comorbidity with body mass index (BMI) of 38.0 to 38.9 in adult, unspecified obesity type  Discussed management and losing weight by about a pound or 2 a week with the goal of losing anywhere between 50 to 75 pounds over the next 12 months  Discussed diet and food options, changing lifestyles and cut back consults, carbs and fats  Recheck in 3 months        ______________________________________________________________________     Yobany Vieira is a 43 y.o. male here for healthcare establishment and coming in today for a routine physical.     Other issues to discuss/ evaluate today:     Having a question about his BP, been reading to be high for the past 2 yrs. No past meds or treatments for this.         Past Medical History:   Diagnosis Date     Depression 2014       Past Surgical History:   Procedure Laterality Date     NO PAST SURGERIES          Family History   Problem Relation Age of Onset     Stroke Father      Hypertension Father      Colon cancer Paternal Grandfather        Social History     Socioeconomic History     Marital status: Single     Spouse name: None     Number of children: None     Years of education: None     Highest education level: None   Occupational History     None   Social Needs     Financial resource strain: None     Food insecurity:     Worry: None     Inability: None     Transportation needs:     Medical: None     Non-medical: None   Tobacco Use     Smoking  "status: Current Every Day Smoker     Smokeless tobacco: Former User   Substance and Sexual Activity     Alcohol use: Yes     Drug use: Never     Sexual activity: None   Lifestyle     Physical activity:     Days per week: None     Minutes per session: None     Stress: None   Relationships     Social connections:     Talks on phone: None     Gets together: None     Attends Confucianist service: None     Active member of club or organization: None     Attends meetings of clubs or organizations: None     Relationship status: None     Intimate partner violence:     Fear of current or ex partner: None     Emotionally abused: None     Physically abused: None     Forced sexual activity: None   Other Topics Concern     None   Social History Narrative     None        No current outpatient medications on file.       There is no immunization history on file for this patient.     ______________________________________________________________________     ROS:  General : negative  Ophthalmic : negative  ENT : negative  Respiratory : no cough, shortness of breath, or wheezing  Cardiovascular : see HPI, no chest pain or dyspnea on exertion  Gastrointestinal : no abdominal pain, change in bowel habits, or black or bloody stools  Genito-Urinary : no dysuria, trouble voiding, or hematuria  Musculoskeletal : negative  Neurological : negative  Dermatological : negative    Allergy and Immunology : negative  Hematological and Lymphatic : negative  Endocrine : negative     ______________________________________________________________________     Exam:    Wt Readings from Last 3 Encounters:   07/03/19 (!) 244 lb (110.7 kg)   10/09/17 (!) 231 lb 9.6 oz (105.1 kg)     BP Readings from Last 3 Encounters:   07/03/19 (!) 146/100   10/09/17 130/80     BP (!) 146/100 (Patient Site: Right Arm, Patient Position: Sitting, Cuff Size: Adult Large)   Pulse 96   Ht 5' 7\" (1.702 m)   Wt (!) 244 lb (110.7 kg)   SpO2 95%   BMI 38.22 kg/m       General " appearance - alert, well appearing, and in no distress  Mental status - alert, oriented to person, place, and time  Eyes - pupils equal and reactive, extraocular eye movements intact  Ears - bilateral TM's and external ear canals normal  Nose - normal and patent, no erythema, discharge or polyps  Mouth - mucous membranes moist, pharynx normal without lesions  Neck - supple, no significant adenopathy, thyroid exam: thyroid is normal in size without nodules or tenderness  Lymphatics - no palpable lymphadenopathy  Chest - clear to auscultation, no wheezes, rales or rhonchi, symmetric air entry  Heart - normal rate, regular rhythm, normal S1, S2, no murmurs, rubs, clicks or gallops  Abdomen - soft, nontender, nondistended, no masses or organomegaly   Male - no penile lesions or discharge, no testicular masses or tenderness, no hernias  Back exam - full range of motion, no tenderness, palpable spasm or pain on motion  Neurological - alert, oriented, normal speech, no focal findings or movement / tremor disorder noted  Musculoskeletal - no joint tenderness, deformity or swelling  Extremities - peripheral pulses normal, no pedal edema, no clubbing or cyanosis  Skin - normal coloration and turgor, no rashes, no suspicious skin lesions noted

## 2021-05-31 VITALS — WEIGHT: 231.6 LBS

## 2021-06-03 VITALS — HEIGHT: 67 IN | BODY MASS INDEX: 38.3 KG/M2 | WEIGHT: 244 LBS

## 2021-06-05 NOTE — TELEPHONE ENCOUNTER
RN Triage:    Spoke with 44 yr old Yobany who c/o:    L sided chest pain, described as an ache, off and on since yesterday.    Rates current pain a 6 on a 0-10 pain scale.    Feels more pain when bending forward.    Hx of high blood pressure.    Ran out of medication and didn't refill.    PLAN:  Advised 911 per protocol.   Pt voiced understanding but is not sure if he will call 911 or go to hospital ED now.    Dalia Crandall RN   [facility] RN Triage      Reason for Disposition    Chest pain lasting longer than 5 minutes and ANY of the following:* Over 50 years old* Over 30 years old and at least one cardiac risk factor (i.e., high blood pressure, diabetes, high cholesterol, obesity, smoker or strong family history of heart disease)* Pain is crushing, pressure-like, or heavy * Took nitroglycerin and chest pain was not relieved* History of heart disease (i.e., angina, heart attack, bypass surgery, angioplasty, CHF)    Protocols used: CHEST PAIN-A-OH

## 2021-06-13 NOTE — PROGRESS NOTES
[facility] Clinic Note  Patient Name: Yobany Vieira  Patient Age: 42 y.o.  YOB: 1975  MRN: [id number]  ?  Date of Visit: 10/9/2017  Reason for Office Visit:   Chief Complaint   Patient presents with     Numbness     right leg x 1 year      HPI: Yobany Vieira 42 y.o. male with a history of dysthymia who is new to our clinic. He presents to for right leg neuropathic pain x year, achy tingling pain from lateral hip to knee. Decreased sensation. No history of trauma. No tingling/numbness in hands and feet. No weakness. No cramps or muscle spasms. Worse at night. Sometimes has restless legs. 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 No radiculopathy or back injuries. No incontinence or saddle anesthesia. He was seen prior at [place]. No significant PMH.        Review of Systems: As noted in HPI     Current Scheduled Meds:  Outpatient Encounter Prescriptions as of 10/9/2017   Medication Sig Dispense Refill     gabapentin (NEURONTIN) 100 MG capsule Take 100 mg by mouth 3 (three) times a day. 90 capsule 0     No facility-administered encounter medications on file as of 10/9/2017.        Objective / Physical Examination:  /80  Pulse 88  Wt (!) 231 lb 9.6 oz (105.1 kg)  SpO2 98%  Wt Readings from Last 3 Encounters:   10/09/17 (!) 231 lb 9.6 oz (105.1 kg)     There is no height or weight on file to calculate BMI. (>25?)    General Appearance: Alert and oriented in no acute distress  Back: Symmetrical and non tender. No midline tenderness. Normal ROM. SLR negative   Cardiovascular: RRR   Extremities: Strength equal throughout.  Integumentary: Warm and dry. Without suspicious looking lesions  Neuro: Alert and oriented, follows commands appropriately. Grossly normal. Gait normal. Numbness and decreased sensation in lower right lateral thigh.     Assessment / Plan / Medical Decision Making:      Encounter Diagnoses   Name Primary?      Neuropathic pain, leg, right Yes     Chronic bilateral low back pain without sciatica      Family history of colon cancer       Neuropathic pain, leg, right  Lateral cutaneous nerve issue or given chronic lower pain could it be around L4 nerve root.   Pain keeping him up at night  Will check some basic labs and try Neurontin, may just try taking it at night though.   Decreased sensation/numbness around L4 distribution on exam. No midline tenderness, no red flags. Will refer to spine care to examine and give additional input.   - Basic Metabolic Panel  - Thyroid Stimulating Hormone (TSH)  - Hemoglobin    Chronic bilateral low back pain without sciatica  - Ambulatory referral to Spine Care    Family history of colon cancer  Should consider early cancer screening  - Ambulatory referral for Colonoscopy    Total time spent with patient was 20  minutes with >50% of time spent in face-to-face counseling regarding the above plan     Didier Lynn MD  [facility]

## 2021-06-17 NOTE — PATIENT INSTRUCTIONS - HE
Patient Instructions by Maico Keller MD at 7/3/2019  8:30 AM     Author: Maico Keller MD Service: -- Author Type: Physician    Filed: 7/3/2019  8:49 AM Encounter Date: 7/3/2019 Status: Addendum    : Maico Keller MD (Physician)    Related Notes: Original Note by Maico Keller MD (Physician) filed at 7/3/2019  8:49 AM         Patient Education     Low-Salt Diet  This diet removes foods that are high in salt. It also limits the amount of salt you use when cooking. It is most often used for people with high blood pressure, edema (fluid retention), and kidney, liver, or heart disease.  Table salt contains the mineral sodium. Your body needs sodium to work normally. But too much sodium can make your health problems worse. Your healthcare provider is recommending a low-salt (also called low-sodium) diet for you. Your total daily allowance of salt is 1,500 to 2,300 milligrams (mg). It is less than 1 teaspoon of table salt. This means you can have only about 500 to 700 mg of sodium at each meal. People with certain health problems should limit salt intake to the lower end of the recommended range.    When you cook, dont add much salt. If you can cook without using salt, even better. Dont add salt to your food at the table.  When shopping, read food labels. Salt is often called sodium on the label. Choose foods that are salt-free, low salt, or very low salt. Note that foods with reduced salt may not lower your salt intake enough.    Beans, potatoes, and pasta  Ok: Dry beans, split peas, lentils, potatoes, rice, macaroni, pasta, spaghetti without added salt  Avoid: Potato chips, tortilla chips, and similar products  Breads and cereals  Ok: Low-sodium breads, rolls, cereals, and cakes; low-salt crackers, matzo crackers  Avoid: Salted crackers, pretzels, popcorn, Maori toast, pancakes, muffins  Dairy  Ok: Milk, chocolate milk, hot chocolate mix, low-salt cheeses, and yogurt  Avoid:  Processed cheese and cheese spreads; Roquefort, Camembert, and cottage cheese; buttermilk, instant breakfast drink  Desserts  Ok: Ice cream, frozen yogurt, juice bars, gelatin, cookies and pies, sugar, honey, jelly, hard candy  Avoid: Most pies, cakes and cookies prepared or processed with salt; instant pudding  Drinks  Ok: Tea, coffee, fizzy (carbonated) drinks, juices  Avoid: Flavored coffees, electrolyte replacement drinks, sports drinks  Meats  Ok: All fresh meat, fish, poultry, low-salt tuna, eggs, egg substitute  Avoid: Smoked, pickled, brine-cured, or salted meats and fish. This includes park, chipped beef, corned beef, hot dogs, deli meats, ham, kosher meats, salt pork, sausage, canned tuna, salted codfish, smoked salmon, herring, sardines, or anchovies.  Seasonings and spices  Ok: Most seasonings are okay. Good substitutes for salt include: fresh herb blends, hot sauce, lemon, garlic, jones, vinegar, dry mustard, parsley, cilantro, horseradish, tomato paste, regular margarine, mayonnaise, unsalted butter, cream cheese, vegetable oil, cream, low-salt salad dressing and gravy.  Avoid: Regular ketchup, relishes, pickles, soy sauce, teriyaki sauce, Worcestershire sauce, BBQ sauce, tartar sauce, meat tenderizer, chili sauce, regular gravy, regular salad dressing, salted butter  Soups  Ok: Low-salt soups and broths made with allowed foods  Avoid: Bouillon cubes, soups with smoked or salted meats, regular soup and broth  Vegetables  Ok: Most vegetables are okay; also low-salt tomato and vegetable juices  Avoid: Sauerkraut and other brine-soaked vegetables; pickles and other pickled vegetables; tomato juice, olives  Date Last Reviewed: 8/1/2016 2000-2017 [employer]. [address] [place] [address], [place] [address]. All rights reserved. This information is not intended as a substitute for professional medical care. Always follow your healthcare professional's instructions.           Patient Education      Kidney Disease: Avoiding High-Sodium Foods  Sodium is a mineral that the body needs in small amounts. Sodium is found in table salt. Table salt is sodium chloride. Most people eat far more salt than they need. There are 2 main reasons for this. Salt is present in high amounts in most processed foods (pre-prepared foods like breakfast cereals, cookies, and pickles) and in all restaurant foods. In other words, if you are not cooking from fresh ingredients at home, you are very likely eating more salt than you need. When sodium intake is too high, it can increase thirst and cause the body to retain fluid. This can increase blood pressure and strain the kidneys. If you have chronic kidney disease, try not to eat the foods listed here, unless the label states that they are made without salt. People with chronic kidney disease should restrict their sodium intake to less than 1,500 mg of sodium (3,800 mg of table salt) each day.      Canned and processed foods, such as gravies, instant cereal, packaged noodles and potato mixes, olives, pickles, soups, vegetables    Cheeses, such as American, Blue, Parmesan, Roquefort    Cured meats, such as park, beef jerky, bologna, corned beef, ham, hot dogs, sandwich meats, sausages    Fast foods, such as burritos, fish sandwiches, milk shakes, salted French fries, tacos    Frozen foods, such as meat pies, TV dinners, waffles    Salted snacks, such as chips, crackers, peanut popcorn, pretzels, and nuts    Other packaged items, such as antacids, baking soda, bouillon, catsup, lite salt, relish, salted butter and margarine, soy and teriyaki sauce, steak sauce, vegetable juices  Date Last Reviewed: 2/1/2017 2000-2017 [employer]. [address] [place] [address], [place] [address]. All rights reserved. This information is not intended as a substitute for professional medical care. Always follow your healthcare professional's instructions.           Patient Education      Low-Salt Choices  Eating salt (sodium) can make your body retain too much water. Excess water makes your heart work harder. Canned, packaged, and frozen foods are easy to prepare. But they are often high in sodium. Here are some ideas for low-salt foods you can easily make yourself.    For breakfast    Fruit or 100% fruit juice    Whole-wheat bread or an English muffin. Look for sodium content on Nutrition Facts labels.    Low-fat milk or yogurt    Unsalted eggs    Shredded wheat    Corn tortillas    Unsalted steamed rice    Regular (not instant) hot cereal, made without salt  Stay away from:    Sausage, park, and ham    Flour tortillas    Packaged muffins, pancakes, and biscuits    Instant hot cereals    Cottage cheese  For lunch and dinner    Fresh fish, chicken, turkey, or meat--baked, broiled, or roasted without salt    Dry beans, cooked without salt    Tofu, stir-fried without salt    Unsalted fresh fruit and vegetables, or frozen or canned fruit and vegetables with no added salt  Stay away from:    Lunch or deli meat that is cured or smoked    Cheese    Tomato juice and ketchup    Canned vegetables, soups, and fish not labeled as no-salt-added or reduced sodium    Packaged gravies and sauces    Olives, pickles, and relish    Bottled salad dressings  For snacks and desserts    Yogurt    Unsalted, air-popped popcorn    Unsalted nuts or seeds  Stay away from:    Pies and cakes    Packaged dessert mixes    Pizza    Canned and packaged puddings    Pretzels, chips, crackers, and nuts--unless the label says unsalted  Date Last Reviewed: 6/1/2017 2000-2017 [employer]. [address] [place] [address], [place] [address]. All rights reserved. This information is not intended as a substitute for professional medical care. Always follow your healthcare professional's instructions.           Patient Education     Eating Heart-Healthy Foods  Eating has a big impact on your heart health. In fact, eating  healthier can improve several of your heart risks at once. For instance, it helps you manage weight, cholesterol, and blood pressure. Here are ideas to help you make heart-healthy changes without giving up all the foods and flavors you love.  Getting started    Talk with your healthcare provider about eating plans, such as the DASH or Mediterranean diet. You may also be referred to a dietitian.    Change a few things at a time. Give yourself time to get used to a few eating changes before adding more.    Work to create a tasty, healthy eating plan that you can stick to for the rest of your life.    Goals for healthy eating  Below are some tips to improve your eating habits:    Limit saturated fats and trans fats. Saturated fats raise your levels of cholesterol, so keep these fats to a minimum. They are found in foods such as fatty meats, whole milk, cheese, and palm and coconut oils. Avoid trans fats because they lower good cholesterol as well as raise bad cholesterol. Trans fats are most often found in processed foods.    Reduce sodium (salt) intake. Eating too much salt may increase your blood pressure. Limit your sodium intake to 2,300 milligrams (mg) per day (the amount in 1 teaspoon of salt), or less if your healthcare provider recommends it. Dining out less often and eating fewer processed foods are two great ways to decrease the amount of salt you consume.    Managing calories. A calorie is a unit of energy. Your body burns calories for fuel, but if you eat more calories than your body burns, the extras are stored as fat. Your healthcare provider can help you create a diet plan to manage your calories. This will likely include eating healthier foods as well as exercising regularly. To help you track your progress, keep a diary to record what you eat and how often you exercise.  Choose the right foods  Aim to make these foods staples of your diet. If you have diabetes, you may have different recommendations  than what is listed here:    Fruits and vegetables provide plenty of nutrients without a lot of calories. At meals, fill half your plate with these foods. Split the other half of your plate between whole grains and lean protein.    Whole grains are high in fiber and rich in vitamins and nutrients. Good choices include whole-wheat bread, pasta, and brown rice.    Lean proteins give you nutrition with less fat. Good choices include fish, skinless chicken, and beans.    Low-fat or nonfat dairy provides nutrients without a lot of fat. Try low-fat or nonfat milk, cheese, or yogurt.    Healthy fats can be good for you in small amounts. These are unsaturated fats, such as olive oil, nuts, and fish. Try to have at least 2 servings per week of fatty fish, such as salmon, sardines, mackerel, rainbow trout, and albacore tuna. These contain omega-3 fatty acids, which are good for your heart. Flaxseed is another source of a heart-healthy fat.  More on heart-healthy eating  Read food labels  Healthy eating starts at the grocery store. Be sure to pay attention to food labels on packaged foods. Look for products that are high in fiber and protein, and low in saturated fat, cholesterol, and sodium. Avoid products that contain trans fat. And pay close attention to serving size. For instance, if you plan to eat two servings, double all the numbers on the label.  Prepare food right  A key part of healthy cooking is cutting down on added fat and salt. Look on the internet for lower-fat, lower-sodium recipes. Also, try these tips:    Remove fat from meat and skin from poultry before cooking.    Skim fat from the surface of soups and sauces.    Broil, boil, bake, steam, grill, and microwave food without added fats.    Choose ingredients that spice up your food without adding calories, fat, or sodium. Try these items: horseradish, hot sauce, lemon, mustard, nonfat salad dressings, and vinegar. For salt-free herbs and spices, try basil,  cilantro, cinnamon, pepper, and rosemary.  Date Last Reviewed: 10/1/2017    4389-7241 [employer] [address] [place] [address], [place] [address]. All rights reserved. This information is not intended as a substitute for professional medical care. Always follow your healthcare professional's instructions.         1. Routine history and physical examination of adult  - Comprehensive Metabolic Panel  - Lipid Profile    2. Essential hypertension  - Electrocardiogram Perform and Read  - hydroCHLOROthiazide (HYDRODIURIL) 25 MG tablet; Take 1 tablet (25 mg total) by mouth daily.  Dispense: 30 tablet; Refill: 0    Follow up in a month to recheck

## 2021-06-19 NOTE — LETTER
Letter by Maico Keller MD at      Author: Maico Keller MD Service: -- Author Type: --    Filed:  Encounter Date: 7/3/2019 Status: (Other)         Yobany Vieira  [address]  [facility] [address]             July 3, 2019         Dear Mr. Vieira,    Below are the results from your recent visit:    Resulted Orders   Comprehensive Metabolic Panel   Result Value Ref Range    Sodium 136 136 - 145 mmol/L    Potassium 3.9 3.5 - 5.0 mmol/L    Chloride 102 98 - 107 mmol/L    CO2 24 22 - 31 mmol/L    Anion Gap, Calculation 10 5 - 18 mmol/L    Glucose 106 70 - 125 mg/dL    BUN 12 8 - 22 mg/dL    Creatinine 1.20 0.70 - 1.30 mg/dL    GFR MDRD Af Amer >60 >60 mL/min/1.73m2    GFR MDRD Non Af Amer >60 >60 mL/min/1.73m2    Bilirubin, Total 0.6 0.0 - 1.0 mg/dL    Calcium 10.0 8.5 - 10.5 mg/dL    Protein, Total 8.0 6.0 - 8.0 g/dL    Albumin 4.1 3.5 - 5.0 g/dL    Alkaline Phosphatase 68 45 - 120 U/L    AST 27 0 - 40 U/L    ALT 34 0 - 45 U/L    Narrative    Fasting Glucose reference range is 70-99 mg/dL per  American Diabetes Association (ADA) guidelines.   Lipid Profile   Result Value Ref Range    Triglycerides 193 (H) <=149 mg/dL    Cholesterol 167 <=199 mg/dL    LDL Calculated 72 <=129 mg/dL    HDL Cholesterol 56 >=40 mg/dL    Patient Fasting > 8hrs? Yes              Normal           Please call with questions or contact us using Mine.    Sincerely,        Electronically signed by Maico Keller MD

## 2021-06-19 NOTE — LETTER
Letter by Maico Keller MD at      Author: Maico Keller MD Service: -- Author Type: --    Filed:  Encounter Date: 7/3/2019 Status: (Other)         Yobany Vieira  [address]  [facility] [address]             July 3, 2019         Dear Mr. Vieira,    Below are the results from your recent visit:    Resulted Orders   Comprehensive Metabolic Panel   Result Value Ref Range    Sodium 136 136 - 145 mmol/L    Potassium 3.9 3.5 - 5.0 mmol/L    Chloride 102 98 - 107 mmol/L    CO2 24 22 - 31 mmol/L    Anion Gap, Calculation 10 5 - 18 mmol/L    Glucose 106 70 - 125 mg/dL    BUN 12 8 - 22 mg/dL    Creatinine 1.20 0.70 - 1.30 mg/dL    GFR MDRD Af Amer >60 >60 mL/min/1.73m2    GFR MDRD Non Af Amer >60 >60 mL/min/1.73m2    Bilirubin, Total 0.6 0.0 - 1.0 mg/dL    Calcium 10.0 8.5 - 10.5 mg/dL    Protein, Total 8.0 6.0 - 8.0 g/dL    Albumin 4.1 3.5 - 5.0 g/dL    Alkaline Phosphatase 68 45 - 120 U/L    AST 27 0 - 40 U/L    ALT 34 0 - 45 U/L    Narrative    Fasting Glucose reference range is 70-99 mg/dL per  American Diabetes Association (ADA) guidelines.   Lipid Profile   Result Value Ref Range    Triglycerides 193 (H) <=149 mg/dL    Cholesterol 167 <=199 mg/dL    LDL Calculated 72 <=129 mg/dL    HDL Cholesterol 56 >=40 mg/dL    Patient Fasting > 8hrs? Yes             Mildly elevated Triglycerides    Make an attempt to improve diet, cut back on the carbs and fats,  and exercise more  efficiently and Regularly        Please call with questions or contact us using Ref[employer]yt.    Sincerely,        Electronically signed by Maico Keller MD

## 2021-07-12 DIAGNOSIS — I10 BENIGN ESSENTIAL HYPERTENSION: ICD-10-CM

## 2021-07-12 NOTE — TELEPHONE ENCOUNTER
Per last refill, patient was to return for BP check and lab work - patient did not follow up.     Team, please assist with scheduling, then route to provider to consider mandy refill.     Francine Jimenez, RN, BSN, PHN  [facility]: [place]

## 2021-07-12 NOTE — LETTER
July 21, 2021      Yobany Vieira  [id number]  [place] [address]        Dear Yobany,       We recently received a call from your pharmacy requesting a refill of your blood pressure medication.      A review of your chart indicates that an appointment is required with your provider. Please call the clinic at [phone] to schedule your appointment.         Sincerely,        Susan Simpson NP

## 2021-07-13 NOTE — TELEPHONE ENCOUNTER
Left message on answering machine for patient to call back and schedule an appointment.  Cait Escoto CMA ([facility])

## 2021-07-21 NOTE — TELEPHONE ENCOUNTER
Letter mailed to patient's home address to call back and schedule an appointment.  Cait Escoto CMA ([facility])

## 2021-07-26 RX ORDER — HYDROCHLOROTHIAZIDE 25 MG/1
25 TABLET ORAL DAILY
Qty: 30 TABLET | Refills: 0 | Status: SHIPPED | OUTPATIENT
Start: 2021-07-26 | End: 2022-01-12

## 2021-07-26 NOTE — TELEPHONE ENCOUNTER
Routing refill request to provider for review/approval because:  BP Readings from Last 3 Encounters:   04/14/21 (!) 160/108   03/03/17 136/84   08/31/15 (!) 134/100     Patient contacted x 3 to schedule advised follow up, unable to reach.     Francine Jimenez, RN, BSN, PHN  [facility]: [place]

## 2021-07-27 NOTE — TELEPHONE ENCOUNTER
Stypi message sent.    Thank you,  Kelly STALLINGS  [facility]  Team [place] Coordinator

## 2021-07-28 NOTE — TELEPHONE ENCOUNTER
Patient contacted and scheduled.    Thank you,  Kelly STALLINGS  [facility]  Team [place]anna Coordinator

## 2021-09-19 ENCOUNTER — HEALTH MAINTENANCE LETTER (OUTPATIENT)
Age: 46
End: 2021-09-19

## 2022-01-12 ENCOUNTER — OFFICE VISIT (OUTPATIENT)
Dept: FAMILY MEDICINE | Facility: CLINIC | Age: 47
End: 2022-01-12
Payer: COMMERCIAL

## 2022-01-12 VITALS
TEMPERATURE: 98.3 F | BODY MASS INDEX: 39.24 KG/M2 | OXYGEN SATURATION: 98 % | RESPIRATION RATE: 23 BRPM | HEART RATE: 92 BPM | HEIGHT: 67 IN | SYSTOLIC BLOOD PRESSURE: 160 MMHG | DIASTOLIC BLOOD PRESSURE: 110 MMHG | WEIGHT: 250 LBS

## 2022-01-12 DIAGNOSIS — R06.83 SNORING: ICD-10-CM

## 2022-01-12 DIAGNOSIS — Z28.21 INFLUENZA VACCINATION DECLINED: ICD-10-CM

## 2022-01-12 DIAGNOSIS — F17.200 NICOTINE DEPENDENCE, UNCOMPLICATED, UNSPECIFIED NICOTINE PRODUCT TYPE: ICD-10-CM

## 2022-01-12 DIAGNOSIS — I10 BENIGN ESSENTIAL HYPERTENSION: Primary | ICD-10-CM

## 2022-01-12 DIAGNOSIS — E66.01 MORBID OBESITY (H): ICD-10-CM

## 2022-01-12 DIAGNOSIS — L72.9 BENIGN SKIN CYST: ICD-10-CM

## 2022-01-12 PROCEDURE — 99214 OFFICE O/P EST MOD 30 MIN: CPT | Performed by: FAMILY MEDICINE

## 2022-01-12 PROCEDURE — 99406 BEHAV CHNG SMOKING 3-10 MIN: CPT | Performed by: FAMILY MEDICINE

## 2022-01-12 RX ORDER — HYDROCHLOROTHIAZIDE 25 MG/1
25 TABLET ORAL DAILY
Qty: 90 TABLET | Refills: 3 | Status: SHIPPED | OUTPATIENT
Start: 2022-01-12 | End: 2023-02-03 | Stop reason: SINTOL

## 2022-01-12 ASSESSMENT — MIFFLIN-ST. JEOR: SCORE: 1979.93

## 2022-01-12 ASSESSMENT — PAIN SCALES - GENERAL: PAINLEVEL: NO PAIN (0)

## 2022-01-12 NOTE — PROGRESS NOTES
Assessment & Plan     Benign essential hypertension  - Has been out of his meds for months  - Re-start medication and follow up in 2-4 weeks for recheck   - Patient requesting referral to [facility] for cardiac screening   - hydrochlorothiazide (HYDRODIURIL) 25 MG tablet; Take 1 tablet (25 mg) by mouth daily  - Home Blood Pressure Monitor Order for DME - ONLY FOR DME  - REFERRAL TO [facility] FOR CARDIOVASCULAR DISEASE PREVN    Morbid obesity (H)  - REFERRAL TO [facility] FOR CARDIOVASCULAR DISEASE PREVN    Nicotine dependence, uncomplicated, unspecified nicotine product type  - SMOKING CESSATION COUNSELING 3-10 MIN  - REFERRAL TO [facility] FOR CARDIOVASCULAR DISEASE PREVN    Benign skin cyst  - Not concerned about his right ear lobe cyst.  The one on his face is in an odd spot for an epidermal cyst.  Will have derm take a look to see if they think it needs removal   - Adult Dermatology Referral; Future    Snoring  - SLEEP EVALUATION & MANAGEMENT REFERRAL - ADULT -; Future    Influenza vaccination declined      Return in about 4 weeks (around 2/9/2022) for BP Recheck.    Gabby Proctor DO  [facility]    =============================================================    Subjective   Yobany is a 46 year old who presents for the following health issues     HPI     Bumps on left side of nose and inside his right ear. He noticed it 3-4 months ago but no pain and no change in size either.  Lesion on left side of nose appeared about 6 months ago.  He thought it was a pimple, but it never went away.  Has not been growing.  He noticed the lesion on his ear about a month later.      Has been out of BP meds for at least 4-6 months.  Has been having mild headaches.  No chest pain or SOB.  Also says that his wife has noticed he stops breathing in his sleep.  He is smoking.  Discussed.  He is not ready for medication for smoking cessation at this time.        Review of  "Systems   Constitutional, HEENT, cardiovascular, pulmonary, gi and gu systems are negative, except as otherwise noted.      Objective    BP (!) 160/110 (BP Location: Right arm, Patient Position: Chair, Cuff Size: Adult Large)   Pulse 92   Temp 98.3  F (36.8  C) (Oral)   Resp 23   Ht 1.697 m (5' 6.83\")   Wt 113.4 kg (250 lb)   SpO2 98%   BMI 39.35 kg/m    Body mass index is 39.35 kg/m .  Physical Exam   GENERAL: healthy, alert and no distress  RESP: lungs clear to auscultation - no rales, rhonchi or wheezes  CV: regular rates and rhythm, normal S1 S2, no S3 or S4 and no murmur, click or rub  SKIN: Right ear with a cystic mass in the ear lobe measuring approx 0.5 cm in diameter.  Similar sized cystic lesion just to the left of his nasal bridge.  Both lesions are mobile and well circumscribed.  Nontender            "

## 2022-03-16 ENCOUNTER — TELEPHONE (OUTPATIENT)
Dept: FAMILY MEDICINE | Facility: CLINIC | Age: 47
End: 2022-03-16
Payer: COMMERCIAL

## 2022-03-31 ENCOUNTER — E-VISIT (OUTPATIENT)
Dept: URGENT CARE | Facility: URGENT CARE | Age: 47
End: 2022-03-31
Payer: COMMERCIAL

## 2022-03-31 DIAGNOSIS — N39.0 ACUTE UTI (URINARY TRACT INFECTION): Primary | ICD-10-CM

## 2022-03-31 PROCEDURE — 99421 OL DIG E/M SVC 5-10 MIN: CPT | Performed by: EMERGENCY MEDICINE

## 2022-03-31 RX ORDER — NITROFURANTOIN 25; 75 MG/1; MG/1
100 CAPSULE ORAL 2 TIMES DAILY
Qty: 10 CAPSULE | Refills: 0 | Status: SHIPPED | OUTPATIENT
Start: 2022-03-31 | End: 2022-04-05

## 2022-03-31 NOTE — PATIENT INSTRUCTIONS
Dear Yobany Vieira    After reviewing your responses, I've been able to diagnose you with a urinary tract infection, which is a common infection of the bladder with bacteria.    Drinking lots of fluids is also helpful to clear your current infection and prevent the next one.      I have sent a prescription for antibiotics to your pharmacy to treat this infection. I suggest before starting the antibiotic that you go to the lab (instructions included) to have a urine test performed.    It is important that you take all of your prescribed medication even if your symptoms are improving after a few doses.  Taking all of your medicine helps prevent the symptoms from returning.     If your symptoms worsen, you develop pain in your back or stomach, develop fevers, or are not improving in 5 days, please contact your primary care provider for an appointment or visit any of our convenient Walk-in or Urgent Care Centers to be seen, which can be found on our website here.    Thanks again for choosing us as your health care partner,    Ivan Ryan PA-C  Dear Yobany Vieira,     After reviewing your responses, I would like you to come in for a urine test to make sure we treat you correctly as it is less common for males to get urinary tract infections. This urine test is to evaluate you for a possible urinary tract infection, and should be scheduled for today or tomorrow. Schedule a Lab Only appointment here.     Lab appointments are not available at most locations on the weekends. If no Lab Only appointment is available, you should be seen in any of our convenient Walk-in or Urgent Care Centers, which can be found on our website here.     You will receive instructions with your results in [facility] once they are available.     If your symptoms worsen, you develop pain in your back or stomach, develop fevers, or are not improving in 5 days, please contact your primary care provider for an appointment or visit a Walk-in  or Urgent Care Center to be seen.     Thanks again for choosing us as your health care partner,     Ivan Ryan PA-C    Understanding Urinary Tract Infections (UTIs)   Most UTIs are caused by bacteria, but they may also be caused by viruses or fungi. Bacteria from the bowel are the most common source of infection. The infection may start because of any of the following:     Sexual activity.  During sex, bacteria can travel from the penis, vagina, or rectum into the urethra.     Bacteria outside the rectum getting into the urethra.  Bacteria on the skin outside the rectum may travel into the urethra. This is more common in women since the rectum and urethra are closer to each other than in men. Wiping from front to back after using the toilet and keeping the area clean can help prevent germs from getting to the urethra.    Blocked urine flow through the urinary tract. If urine sits too long, germs may start to grow out of control.  Parts of the urinary tract  The infection can occur in any part of the urinary tract.       The kidneys. These organs collect and store urine.    The ureters. These tubes carry urine from the kidneys to the bladder.    The bladder. This holds urine until you are ready to let it out.    The urethra. This tube carries urine from the bladder out of the body. It is shorter in women, so bacteria can move through it more easily. The urethra is longer in men, so a UTI is less likely to reach the bladder or kidneys in men.  [employer] last reviewed this educational content on 1/1/2020 2000-2021 [employer]. All rights reserved. This information is not intended as a substitute for professional medical care. Always follow your healthcare professional's instructions.

## 2022-04-01 ENCOUNTER — LAB (OUTPATIENT)
Dept: LAB | Facility: CLINIC | Age: 47
End: 2022-04-01
Payer: COMMERCIAL

## 2022-04-01 DIAGNOSIS — N39.0 ACUTE UTI (URINARY TRACT INFECTION): ICD-10-CM

## 2022-04-01 LAB
ALBUMIN UR-MCNC: NEGATIVE MG/DL
APPEARANCE UR: CLEAR
BACTERIA #/AREA URNS HPF: NORMAL /HPF
BILIRUB UR QL STRIP: NEGATIVE
COLOR UR AUTO: YELLOW
GLUCOSE UR STRIP-MCNC: NEGATIVE MG/DL
HGB UR QL STRIP: ABNORMAL
KETONES UR STRIP-MCNC: ABNORMAL MG/DL
LEUKOCYTE ESTERASE UR QL STRIP: NEGATIVE
NITRATE UR QL: NEGATIVE
PH UR STRIP: 6.5 [PH] (ref 5–8)
RBC #/AREA URNS AUTO: NORMAL /HPF
SP GR UR STRIP: 1.01 (ref 1–1.03)
UROBILINOGEN UR STRIP-ACNC: 0.2 E.U./DL
WBC #/AREA URNS AUTO: NORMAL /HPF

## 2022-04-01 PROCEDURE — 81001 URINALYSIS AUTO W/SCOPE: CPT

## 2022-04-02 ENCOUNTER — NURSE TRIAGE (OUTPATIENT)
Dept: NURSING | Facility: CLINIC | Age: 47
End: 2022-04-02
Payer: COMMERCIAL

## 2022-04-02 NOTE — TELEPHONE ENCOUNTER
E-visit 3/31 for dysuria and frequency. Diagnosed w/ UTI and prescribed abx which he has not started yet. Says pharmacy out of stock. Intends to get at a different pharmacy but not until tomorrow at pharmacy now closed (Sat 6 pm).Does not want to go to 24 hr pharmacy.     He has no new or worsening sx.  UA w/micro done yesterday and he is calling about results. Asks if he needs to do anything about this right away. UA shows trace of blood, trace of ketones. Advised pt these results are not critical; blood can be seen w/ uti. Advised discuss results further w/ his pcp on Mon 4/4. Advised pt start prescribed abx asap.     Reason for Disposition    Caller requesting lab results    Additional Information    Negative: Lab calling with strep throat test results and triager can call in prescription    Negative: Lab calling with urinalysis test results and triager can call in prescription    Negative: Medication questions    Negative: ED call to PCP    Negative: Physician call to PCP    Negative: Call about patient who is currently hospitalized    Negative: Lab or radiology calling with CRITICAL test results    Negative: [1] Prescription not at pharmacy AND [2] was prescribed today by PCP    Negative: [1] Follow-up call from patient regarding patient's clinical status AND [2] information urgent    Negative: [1] Caller requests to speak ONLY to PCP AND [2] urgent question    Negative: [1] Caller requests to speak to PCP now AND [2] won't tell us reason for call  (Exception: if 10 pm to 6 am, caller must first discuss reason for the call)    Negative: Notification of hospital admission    Negative: Notification of death    Protocols used: [facility] CALL [facility]

## 2022-06-25 ENCOUNTER — HEALTH MAINTENANCE LETTER (OUTPATIENT)
Age: 47
End: 2022-06-25

## 2022-11-20 ENCOUNTER — HEALTH MAINTENANCE LETTER (OUTPATIENT)
Age: 47
End: 2022-11-20

## 2023-02-02 ENCOUNTER — NURSE TRIAGE (OUTPATIENT)
Dept: FAMILY MEDICINE | Facility: CLINIC | Age: 48
End: 2023-02-02
Payer: COMMERCIAL

## 2023-02-02 NOTE — TELEPHONE ENCOUNTER
CARE ADVICE: be seen within 3 days.    RN scheduled patient for tomorrow    RN received call from patient.    Patient states in Urgency room 1/31 and B/P was 160/112.  Urgency room advised patient to reach out to primary clinic.    Patient got blood pressure cuff today and B/P 157/95.    Denies headache, slurred speech.    Patient denies missing any doses of medication    Sometimes gets winded when going up and down stairs.    Dull ache in left chest for past several months. Occurs approx 1 time/month.  occassionally radiates to back.  Does not radiate. Denies sweating,    RN assisted patient in scheduling appointment for tomorrow morning.    RN advised patient to continue to monitor blood pressure.     RN reviewed in detail when patient should call back clinic tonight  Or seek immediate medical attention.    Patient verbalized understanding.        Reason for Disposition    Systolic BP >= 160 OR Diastolic >= 100    Additional Information    Negative: Symptom is main concern (e.g., headache, chest pain)    Negative: Low blood pressure is main concern    Negative: Systolic BP >= 160 OR Diastolic >= 100, and any cardiac or neurologic symptoms (e.g., chest pain, difficulty breathing, unsteady gait, blurred vision)    Negative: Sounds like a life-threatening emergency to the triager    Negative: Pregnant 20 or more weeks (or postpartum < 6 weeks) with new hand or face swelling    Negative: Pregnant 20 or more weeks (or postpartum < 6 weeks) and Systolic BP >= 160 OR Diastolic >= 100    Negative: Patient sounds very sick or weak to the triager    Negative: Systolic BP >= 200 OR Diastolic >= 120 and having NO cardiac or neurologic symptoms    Negative: Pregnant 20 or more weeks (or postpartum < 6 weeks) with Systolic BP >= 140 OR Diastolic >= 90    Negative: Systolic BP >= 180 OR Diastolic >= 110, and missed most recent dose of blood pressure medication    Negative: Systolic BP >= 180 OR Diastolic >= 110    Negative:  "Patient wants to be seen    Negative: Ran out of BP medications    Negative: Taking BP medications and feels is having side effects (e.g., impotence, cough, dizziness)    Answer Assessment - Initial Assessment Questions  1. BLOOD PRESSURE: \"What is the blood pressure?\" \"Did you take at least two measurements 5 minutes apart?\"       blood pressure 160/112 in Urgency room 1/31  Today 157/95  2. ONSET: \"When did you take your blood pressure?\"     Took just now  157/95  3. HOW: \"How did you obtain the blood pressure?\" (e.g., visiting nurse, automatic home BP monitor)      automatic  4. HISTORY: \"Do you have a history of high blood pressure?\"      yes  5. MEDICATIONS: \"Are you taking any medications for blood pressure?\" \"Have you missed any doses recently?\"      yes  6. OTHER SYMPTOMS: \"Do you have any symptoms?\" (e.g., headache, chest pain, blurred vision, difficulty breathing, weakness)      No  Sometimes SOB when up/down stairs, on/off several months, no blurry vision.  Fait chest pain/mild ache, left  Tuesday last time, 1 time a month.  Sometimes radiates into back.  Faint, sometimes forgets about it  7. PREGNANCY: \"Is there any chance you are pregnant?\" \"When was your last menstrual period?\"      NA    Protocols used: BLOOD PRESSURE - HIGH-A-OH    Edil Mills RN, BSN, PHN  [facility]  "

## 2023-02-03 ENCOUNTER — OFFICE VISIT (OUTPATIENT)
Dept: FAMILY MEDICINE | Facility: CLINIC | Age: 48
End: 2023-02-03
Payer: COMMERCIAL

## 2023-02-03 VITALS
HEART RATE: 85 BPM | RESPIRATION RATE: 24 BRPM | SYSTOLIC BLOOD PRESSURE: 140 MMHG | TEMPERATURE: 98.2 F | WEIGHT: 234 LBS | BODY MASS INDEX: 36.73 KG/M2 | OXYGEN SATURATION: 99 % | DIASTOLIC BLOOD PRESSURE: 90 MMHG | HEIGHT: 67 IN

## 2023-02-03 DIAGNOSIS — E66.01 MORBID OBESITY (H): ICD-10-CM

## 2023-02-03 DIAGNOSIS — I10 BENIGN ESSENTIAL HYPERTENSION: Primary | ICD-10-CM

## 2023-02-03 DIAGNOSIS — Z12.11 SCREEN FOR COLON CANCER: ICD-10-CM

## 2023-02-03 PROCEDURE — 99214 OFFICE O/P EST MOD 30 MIN: CPT | Performed by: FAMILY MEDICINE

## 2023-02-03 RX ORDER — LISINOPRIL AND HYDROCHLOROTHIAZIDE 20; 25 MG/1; MG/1
1 TABLET ORAL DAILY
Qty: 90 TABLET | Refills: 3 | Status: SHIPPED | OUTPATIENT
Start: 2023-02-03 | End: 2023-06-26 | Stop reason: ALTCHOICE

## 2023-02-03 ASSESSMENT — PATIENT HEALTH QUESTIONNAIRE - PHQ9
SUM OF ALL RESPONSES TO PHQ QUESTIONS 1-9: 0
SUM OF ALL RESPONSES TO PHQ QUESTIONS 1-9: 0
10. IF YOU CHECKED OFF ANY PROBLEMS, HOW DIFFICULT HAVE THESE PROBLEMS MADE IT FOR YOU TO DO YOUR WORK, TAKE CARE OF THINGS AT HOME, OR GET ALONG WITH OTHER PEOPLE: NOT DIFFICULT AT ALL

## 2023-02-03 ASSESSMENT — PAIN SCALES - GENERAL: PAINLEVEL: NO PAIN (0)

## 2023-02-03 NOTE — PROGRESS NOTES
"  Assessment & Plan     Benign essential hypertension  Discussed diet, low salt diet and weight loss  No history of sleep apnea.  Discontinued hydrochlorothiazide,  Started,  - lisinopril-hydrochlorothiazide (ZESTORETIC) 20-25 MG tablet; Take 1 tablet by mouth daily    Screen for colon cancer  screening  - Colonoscopy Screening  Referral; Future    Morbid obesity (H)  Discussed diet and weight loss.  Discussed diet modification, increase physical activity.  Discussed with patient morbid obesity does increase risk for diabetes, sleep apnea, heart disease.  Worsening hypertension.       Nicotine/Tobacco Cessation:  He reports that he has been smoking. He has a 0.25 pack-year smoking history. He has never used smokeless tobacco.  Nicotine/Tobacco Cessation Plan:   light smoker, working to quite      BMI:   Estimated body mass index is 36.65 kg/m  as calculated from the following:    Height as of this encounter: 1.702 m (5' 7\").    Weight as of this encounter: 106.1 kg (234 lb).   Weight management plan: Discussed healthy diet and exercise guidelines    Work on weight loss  Regular exercise    Return in about 2 years (around 2/3/2025) for Routine preventive, in person.    Audrey James MD  [facility]    Imtiaz Haywood is a 47 year old, presenting for the following health issues:  Chronic Disease Management  History of hypertension, has been on hydrochlorothiazide 25 mg for almost 2 years now.  No side effect with the medication.  Patient reports blood pressure has been on the higher side.  He denies chest pain, short of breath, denies headache, denies being lightheaded or dizzy.  He has no history of sleep apnea.  He is a light smoker.    History of Present Illness       Hypertension: He presents for follow up of hypertension.  He does check blood pressure  regularly outside of the clinic. Outside blood pressures have been over 140/90. He follows a low salt diet.     He eats " "0-1 servings of fruits and vegetables daily.He consumes 0 sweetened beverage(s) daily.He exercises with enough effort to increase his heart rate 30 to 60 minutes per day.  He exercises with enough effort to increase his heart rate 3 or less days per week.   He is taking medications regularly.    Today's PHQ-9         PHQ-9 Total Score: 0    PHQ-9 Q9 Thoughts of better off dead/self-harm past 2 weeks :   Not at all    How difficult have these problems made it for you to do your work, take care of things at home, or get along with other people: Not difficult at all       Review of Systems   Constitutional, HEENT, cardiovascular, pulmonary, GI, , musculoskeletal, neuro, skin, endocrine and psych systems are negative, except as otherwise noted.      Objective    BP (!) 140/96 (BP Location: Right arm, Patient Position: Chair, Cuff Size: Adult Large)   Pulse 85   Temp 98.2  F (36.8  C) (Tympanic)   Resp 24   Ht 1.702 m (5' 7\")   Wt 106.1 kg (234 lb)   SpO2 99%   BMI 36.65 kg/m    Body mass index is 36.65 kg/m .  Physical Exam   GENERAL: healthy, alert and no distress  NECK: no adenopathy, no asymmetry, masses, or scars and thyroid normal to palpation  RESP: lungs clear to auscultation - no rales, rhonchi or wheezes  CV: regular rate and rhythm, normal S1 S2, no S3 or S4, no murmur, click or rub, no peripheral edema and peripheral pulses strong  ABDOMEN: soft, nontender, no hepatosplenomegaly, no masses and bowel sounds normal  MS: no gross musculoskeletal defects noted, no edema  PSYCH: mentation appears normal, affect normal/bright    Orders Placed This Encounter   Procedures     REVIEW OF HEALTH MAINTENANCE PROTOCOL ORDERS     Colonoscopy Screening  Referral         Audrey James MD            "

## 2023-02-15 RX ORDER — HYDROCHLOROTHIAZIDE 25 MG/1
TABLET ORAL
Qty: 90 TABLET | Refills: 2 | OUTPATIENT
Start: 2023-02-15

## 2023-03-06 ENCOUNTER — OFFICE VISIT (OUTPATIENT)
Dept: FAMILY MEDICINE | Facility: CLINIC | Age: 48
End: 2023-03-06
Payer: COMMERCIAL

## 2023-03-06 VITALS
HEIGHT: 67 IN | HEART RATE: 86 BPM | TEMPERATURE: 98.4 F | DIASTOLIC BLOOD PRESSURE: 84 MMHG | OXYGEN SATURATION: 100 % | SYSTOLIC BLOOD PRESSURE: 130 MMHG | WEIGHT: 235 LBS | BODY MASS INDEX: 36.88 KG/M2 | RESPIRATION RATE: 24 BRPM

## 2023-03-06 DIAGNOSIS — Z12.11 SCREEN FOR COLON CANCER: ICD-10-CM

## 2023-03-06 DIAGNOSIS — I10 BENIGN ESSENTIAL HYPERTENSION: Primary | ICD-10-CM

## 2023-03-06 LAB
ANION GAP SERPL CALCULATED.3IONS-SCNC: 11 MMOL/L (ref 7–15)
BUN SERPL-MCNC: 16.1 MG/DL (ref 6–20)
CALCIUM SERPL-MCNC: 9.7 MG/DL (ref 8.6–10)
CHLORIDE SERPL-SCNC: 100 MMOL/L (ref 98–107)
CREAT SERPL-MCNC: 1.23 MG/DL (ref 0.67–1.17)
DEPRECATED HCO3 PLAS-SCNC: 27 MMOL/L (ref 22–29)
GFR SERPL CREATININE-BSD FRML MDRD: 74 ML/MIN/1.73M2
GLUCOSE SERPL-MCNC: 98 MG/DL (ref 70–99)
POTASSIUM SERPL-SCNC: 3.9 MMOL/L (ref 3.4–5.3)
SODIUM SERPL-SCNC: 138 MMOL/L (ref 136–145)

## 2023-03-06 PROCEDURE — 36415 COLL VENOUS BLD VENIPUNCTURE: CPT | Performed by: FAMILY MEDICINE

## 2023-03-06 PROCEDURE — 80048 BASIC METABOLIC PNL TOTAL CA: CPT | Performed by: FAMILY MEDICINE

## 2023-03-06 PROCEDURE — 99213 OFFICE O/P EST LOW 20 MIN: CPT | Performed by: FAMILY MEDICINE

## 2023-03-06 ASSESSMENT — PAIN SCALES - GENERAL: PAINLEVEL: NO PAIN (0)

## 2023-03-06 NOTE — PROGRESS NOTES
"  Assessment & Plan      Diagnosis Comments   1. Benign essential hypertension        2. Screen for colon cancer  Colonoscopy Screening  Referral, Basic metabolic panel  (Ca, Cl, CO2, Creat, Gluc, K, Na, BUN)         Blood pressure is better controlled.  Patient will continue with his current medication.  Advised with diet, exercise, increase physical activity, weight loss    Patient is cutting down on his smoking, and planning to quite in the next a few weeks    Colon cancer screening.    Return in 1 year (on 3/6/2024) for Routine preventive, in person.      Imtiaz Haywood is a 47 year old presenting for the following health issues:  Chronic Disease Management  Patient reports he is doing well  with his current medication.  He reports sometimes he may have a tickle in his throat otherwise, no chronic cough.  No side effect of the medication.  Weight remains stable.  He has been cutting down on his smoking.    He is due for colon cancer screening    History of Present Illness       Hypertension: He presents for follow up of hypertension.  He does check blood pressure  regularly outside of the clinic. Outside blood pressures have been over 140/90. He follows a low salt diet.     He eats 0-1 servings of fruits and vegetables daily.He consumes 0 sweetened beverage(s) daily.He exercises with enough effort to increase his heart rate 10 to 19 minutes per day.  He exercises with enough effort to increase his heart rate 3 or less days per week.   He is taking medications regularly.       Review of Systems   Constitutional, HEENT, cardiovascular, pulmonary, GI, , musculoskeletal, neuro, skin, endocrine and psych systems are negative, except as otherwise noted.      Objective    /84   Pulse 86   Temp 98.4  F (36.9  C) (Tympanic)   Resp 24   Ht 1.69 m (5' 6.54\")   Wt 106.6 kg (235 lb)   SpO2 100%   BMI 37.32 kg/m    Body mass index is 37.32 kg/m .  Physical Exam   GENERAL: healthy, alert and no " distress  PSYCH: mentation appears normal, affect normal/bright    Orders Placed This Encounter   Procedures     Basic metabolic panel  (Ca, Cl, CO2, Creat, Gluc, K, Na, BUN)     Colonoscopy Screening  Referral         Audrey James MD

## 2023-03-07 ENCOUNTER — TELEPHONE (OUTPATIENT)
Dept: FAMILY MEDICINE | Facility: CLINIC | Age: 48
End: 2023-03-07
Payer: COMMERCIAL

## 2023-03-07 DIAGNOSIS — I10 BENIGN ESSENTIAL HYPERTENSION: Primary | ICD-10-CM

## 2023-03-07 NOTE — TELEPHONE ENCOUNTER
RN called and spoke with patient.    Patient reviewed providers message and recommendations.    RN assisted patient in scheduling appointment.    Edil Mills RN, BSN, PHN  [facility]

## 2023-03-07 NOTE — TELEPHONE ENCOUNTER
----- Message from Audrey James MD sent at 3/7/2023  7:00 AM CST -----  Please call pt with results  Normal for electrolyte  Kidney function is off base line ( could related to a new blood pressure med )    Please advise pt to increase water intake up to 64 oz a day    Repeat labs in 2 weeks    thanks

## 2023-03-22 ENCOUNTER — TRANSFERRED RECORDS (OUTPATIENT)
Dept: HEALTH INFORMATION MANAGEMENT | Facility: CLINIC | Age: 48
End: 2023-03-22
Payer: COMMERCIAL

## 2023-06-26 ENCOUNTER — NURSE TRIAGE (OUTPATIENT)
Dept: FAMILY MEDICINE | Facility: CLINIC | Age: 48
End: 2023-06-26
Payer: COMMERCIAL

## 2023-06-26 DIAGNOSIS — I10 BENIGN ESSENTIAL HYPERTENSION: Primary | ICD-10-CM

## 2023-06-26 RX ORDER — LOSARTAN POTASSIUM AND HYDROCHLOROTHIAZIDE 12.5; 5 MG/1; MG/1
1 TABLET ORAL DAILY
Qty: 90 TABLET | Refills: 3 | Status: SHIPPED | OUTPATIENT
Start: 2023-06-26 | End: 2023-06-26

## 2023-06-26 RX ORDER — LOSARTAN POTASSIUM AND HYDROCHLOROTHIAZIDE 12.5; 5 MG/1; MG/1
1 TABLET ORAL DAILY
Qty: 90 TABLET | Refills: 3 | Status: SHIPPED | OUTPATIENT
Start: 2023-06-26 | End: 2024-08-13

## 2023-06-26 NOTE — TELEPHONE ENCOUNTER
Patient calling.     He is experiencing some lip swelling starting yesterday. He had a bit of a cough and swollen lip. Both the lip and cough have improved since yesterday. No difficulty breathing, no airway closure, no tongue swelling, no wheezing.     RN huddled with PCP and pcp advised to stop med and gave verbal order to RN to order losartan-hydrochlorothiazide 50-12.5 mg tablet.     RN told patient to stop med and start new med sent to preferred pharmacy.     RN advised when to go to ED and patient verbalized understanding.     RN scheduled bp recheck for next week    Soha Mendosa RN

## 2023-07-08 ENCOUNTER — HEALTH MAINTENANCE LETTER (OUTPATIENT)
Age: 48
End: 2023-07-08

## 2024-06-05 ENCOUNTER — OFFICE VISIT (OUTPATIENT)
Dept: FAMILY MEDICINE | Facility: CLINIC | Age: 49
End: 2024-06-05
Payer: COMMERCIAL

## 2024-06-05 VITALS
HEIGHT: 67 IN | TEMPERATURE: 97.8 F | BODY MASS INDEX: 37.54 KG/M2 | SYSTOLIC BLOOD PRESSURE: 140 MMHG | OXYGEN SATURATION: 97 % | WEIGHT: 239.2 LBS | RESPIRATION RATE: 22 BRPM | DIASTOLIC BLOOD PRESSURE: 106 MMHG | HEART RATE: 82 BPM

## 2024-06-05 DIAGNOSIS — R07.0 THROAT PAIN: ICD-10-CM

## 2024-06-05 DIAGNOSIS — I10 BENIGN ESSENTIAL HYPERTENSION: ICD-10-CM

## 2024-06-05 DIAGNOSIS — K21.00 GASTROESOPHAGEAL REFLUX DISEASE WITH ESOPHAGITIS WITHOUT HEMORRHAGE: ICD-10-CM

## 2024-06-05 DIAGNOSIS — E66.812 CLASS 2 SEVERE OBESITY DUE TO EXCESS CALORIES WITH SERIOUS COMORBIDITY AND BODY MASS INDEX (BMI) OF 37.0 TO 37.9 IN ADULT (H): ICD-10-CM

## 2024-06-05 DIAGNOSIS — E66.01 CLASS 2 SEVERE OBESITY DUE TO EXCESS CALORIES WITH SERIOUS COMORBIDITY AND BODY MASS INDEX (BMI) OF 37.0 TO 37.9 IN ADULT (H): ICD-10-CM

## 2024-06-05 PROCEDURE — 99214 OFFICE O/P EST MOD 30 MIN: CPT | Performed by: NURSE PRACTITIONER

## 2024-06-05 PROCEDURE — G2211 COMPLEX E/M VISIT ADD ON: HCPCS | Performed by: NURSE PRACTITIONER

## 2024-06-05 RX ORDER — AMLODIPINE BESYLATE 5 MG/1
5 TABLET ORAL DAILY
Qty: 90 TABLET | Refills: 0 | Status: SHIPPED | OUTPATIENT
Start: 2024-06-05 | End: 2024-08-27

## 2024-06-05 ASSESSMENT — PATIENT HEALTH QUESTIONNAIRE - PHQ9
SUM OF ALL RESPONSES TO PHQ QUESTIONS 1-9: 2
10. IF YOU CHECKED OFF ANY PROBLEMS, HOW DIFFICULT HAVE THESE PROBLEMS MADE IT FOR YOU TO DO YOUR WORK, TAKE CARE OF THINGS AT HOME, OR GET ALONG WITH OTHER PEOPLE: NOT DIFFICULT AT ALL
SUM OF ALL RESPONSES TO PHQ QUESTIONS 1-9: 2

## 2024-06-05 ASSESSMENT — PAIN SCALES - GENERAL: PAINLEVEL: NO PAIN (0)

## 2024-06-05 NOTE — PROGRESS NOTES
"  Assessment & Plan     Throat pain  - Adult ENT  Referral  Reports pain on Get's Apple area- to touch and when swallowing ongoing for 1 month. No swallowing difficulty morning  No recent illness. No generalized malaise or unintentional weight loss.   Has GERD and possible esophagitis- will start treat withment with omeprazole  Will refer to ENT- give hx of smoking and uncle hx of throat cancer -  Advised on smoking cessation  Avoid foods that trigger GERD    Gastroesophageal reflux disease with esophagitis without hemorrhage  - omeprazole (PRILOSEC) 20 MG DR capsule  Dispense: 90 capsule; Refill: 0  Provided with GERD care instructions    Class 2 severe obesity due to excess calories with serious comorbidity and body mass index (BMI) of 37.0 to 37.9 in adult (H)  - Adult Nutrition  Referral  - Med Therapy Management Referral    Benign essential hypertension  Not well-controlled with current treatment Hyzaar 50-12.5 mg tablet daily  Will add amlodipine 5 mg daily  - amLODIPine (NORVASC) 5 MG tablet  Dispense: 90 tablet; Refill: 0  - Comprehensive metabolic panel  - CBC with platelets  - TSH with free T4 reflex    Will follow up in 1 month for annual preventive visit- GERD symptoms, HTN.           Nicotine/Tobacco Cessation  He reports that he has been smoking cigarettes. He has a 0.3 pack-year smoking history. He has been exposed to tobacco smoke. He has never used smokeless tobacco.  Nicotine/Tobacco Cessation Plan  Self help information given to patient      BMI  Estimated body mass index is 37.46 kg/m  as calculated from the following:    Height as of this encounter: 1.702 m (5' 7\").    Weight as of this encounter: 108.5 kg (239 lb 3.2 oz).   Weight management plan: Discussed healthy diet and exercise guidelines          Imtiaz Haywood is a 48 year old, presenting for the following health issues:  office visit and Recheck Medication (Pt reports that he is here to discuss having pain in his " "throat near his Get's Apple for about a month.)        2024     8:53 AM   Additional Questions   Roomed by chriss   Accompanied by alone         2024     8:53 AM   Patient Reported Additional Medications   Patient reports taking the following new medications none     Pain on Ramonas apple with swallowing, coughing, or when touching area. Symptoms ongoing for 1 month. No difficulty swallowing  Acid reflux- a couple times a week- takes Tums prn  Acidics trigger it     Drinks alcohol- once a week, couple of shots    Smoking: decreased for 15 years, 2 cigarettes a day, used to smoke 1/2 pack  Uncle had throat cancer -  from it    BP is always high- has not been checking it at home.      History of Present Illness       Reason for visit:  Sore amy apple  Symptom onset:  More than a month    He eats 0-1 servings of fruits and vegetables daily.He consumes 0 sweetened beverage(s) daily.He exercises with enough effort to increase his heart rate 20 to 29 minutes per day.  He exercises with enough effort to increase his heart rate 3 or less days per week.   He is taking medications regularly.                     Objective    BP (!) 140/106   Pulse 82   Temp 97.8  F (36.6  C) (Tympanic)   Resp 22   Ht 1.702 m (5' 7\")   Wt 108.5 kg (239 lb 3.2 oz)   SpO2 97%   BMI 37.46 kg/m    Body mass index is 37.46 kg/m .  Physical Exam   GENERAL: alert and no distress  EYES: Eyes grossly normal to inspection, PERRL and conjunctivae and sclerae normal  HENT: ear canals and TM's normal, nose and mouth without ulcers or lesions  NECK: no adenopathy, no asymmetry, masses, or scars  RESP: lungs clear to auscultation - no rales, rhonchi or wheezes  CV: regular rate and rhythm, normal S1 S2, no S3 or S4, no murmur, click or rub, no peripheral edema  ABDOMEN: soft, nontender, no hepatosplenomegaly, no masses and bowel sounds normal  MS: no gross musculoskeletal defects noted, no edema  SKIN: no suspicious lesions or " rashes  NEURO: Normal strength and tone, mentation intact and speech normal  PSYCH: mentation appears normal, affect normal/bright          The longitudinal plan of care for the diagnosis(es)/condition(s) as documented were addressed during this visit. Due to the added complexity in care, I will continue to support Yobany in the subsequent management and with ongoing continuity of care.    Signed Electronically by: DASH Arnold CNP

## 2024-06-07 ENCOUNTER — TELEPHONE (OUTPATIENT)
Dept: FAMILY MEDICINE | Facility: CLINIC | Age: 49
End: 2024-06-07
Payer: COMMERCIAL

## 2024-06-07 NOTE — TELEPHONE ENCOUNTER
MTM referral from: [place] clinic visit (referral by provider)    MTM referral outreach attempt #2 on June 7, 2024 at 9:00 AM      Outcome: Patient not reachable after several attempts, sent [email or web address] message    Use [employer] [facility] adv for the carrier/Plan on the flowsheet      MyChart Message Sent    Haydee Bhakta CPhT  MT

## 2024-07-30 ENCOUNTER — OFFICE VISIT (OUTPATIENT)
Dept: FAMILY MEDICINE | Facility: CLINIC | Age: 49
End: 2024-07-30
Payer: COMMERCIAL

## 2024-07-30 VITALS
TEMPERATURE: 97.5 F | RESPIRATION RATE: 20 BRPM | BODY MASS INDEX: 37.2 KG/M2 | OXYGEN SATURATION: 99 % | HEART RATE: 94 BPM | HEIGHT: 67 IN | SYSTOLIC BLOOD PRESSURE: 144 MMHG | WEIGHT: 237 LBS | DIASTOLIC BLOOD PRESSURE: 92 MMHG

## 2024-07-30 DIAGNOSIS — M25.851 HIP IMPINGEMENT SYNDROME, RIGHT: Primary | ICD-10-CM

## 2024-07-30 PROCEDURE — G2211 COMPLEX E/M VISIT ADD ON: HCPCS

## 2024-07-30 PROCEDURE — 99213 OFFICE O/P EST LOW 20 MIN: CPT

## 2024-07-30 RX ORDER — METHYLPREDNISOLONE 4 MG
TABLET, DOSE PACK ORAL
Qty: 21 TABLET | Refills: 0 | Status: SHIPPED | OUTPATIENT
Start: 2024-07-30

## 2024-07-30 RX ORDER — NAPROXEN 500 MG/1
500 TABLET ORAL 2 TIMES DAILY WITH MEALS
Qty: 28 TABLET | Refills: 0 | Status: SHIPPED | OUTPATIENT
Start: 2024-07-30 | End: 2024-08-13

## 2024-07-30 ASSESSMENT — PAIN SCALES - GENERAL: PAINLEVEL: EXTREME PAIN (8)

## 2024-07-30 NOTE — PROGRESS NOTES
Assessment & Plan     (M25.851) Hip impingement syndrome, right  (primary encounter diagnosis)  Comment: Acute and stable.  No concerns for cauda equina, severe radiculopathy bilaterally, septic joint, or fracture that would warrant the need for immediate medical attention.  Considering localization of pain at anterior lateral aspect of thigh with burning sensation and stabbing pain, findings are supportive of hip impingement.  Not specifically concern for lumbar radiculopathy considering lack of back pain.  Some IT band tightness appreciated, but pain is not specifically presenting as an IT band injury.  Will move forward with Medrol Dosepak and naproxen for impingement management, and sent patient with exercises after visit summary for management as well.  Would like him to work this plan of care for at least the next 2 weeks, and follow-up after that time if he is seeing no improvement.  May need to consider x-ray imaging and/or more formal PT referral at that time. Offered education on medications including appropriate dosing, possible side effects, and possible adverse effects.  Education given on return to clinic instructions as well as alarm signs that would require the need for immediate medical attention.  Patient attested to understanding.  Plan: methylPREDNISolone (MEDROL DOSEPAK) 4 MG tablet        therapy pack, naproxen (NAPROSYN) 500 MG tablet      Prescription drug management  I spent a total of 15 minutes on the day of the visit.   Time spent by me doing chart review, history and exam, documentation and further activities per the note    FUTURE APPOINTMENTS:       - Follow-up visit in 2 to 4 weeks if symptoms worsen or do not improve       - Follow-up for annual visit or as needed  Patient Instructions       Pain Management  Begin taking medications to manage pain today as we discussed. These will work best to manage your pain when taken around the clock rather than every so often when your pain is  worse  -Medrol Dose Jonathan: This is a short course of oral steroids that will help to reduce some of the extra inflammation that is contributing to the pain. Take this in the morning with some food.  Side effects might include insomnia, hunger, and increased emotional irritability.  All of the side effects should resolve once you are done taking the medication.  -Tylenol 325-650mg every 4-6 hours  -Naproxen 500mg twice daily for 2 weeks - do not mixe this with advil, aleve, or ibuprofen. You can begin taking these other medications once you are done with the Naproxen    Apply cold packs to the area for 20min at a time 4 times per day. This will help to reduce swelling and offer some numbing to manage pain. You can also use warm packs to manage pain if this provides better relief.    Movement  It is very important to maintain your normal daily activities as tolerated. Avoiding sitting, laying, standing, or remaining in one position for extended periods of time. Continuing to stretch and move your body will help to encourage healing and keep your muscles loose.    I have attached some exercises to your AVS that I would like you to try for the pain    Next Step  If this plan doesn't seem to be helping after 4-6 weeks then I want you to come back to see me, and we can discuss what the next options might be. Seek emergency medical help If pain becomes unbearable, is not responding to pain medication, or is accompanied by fever, warmth, or loss of function.      Imtiaz Haywood is a 49 year old, presenting for the following health issues:  Musculoskeletal Problem and Recheck Medication (Pt reports that he is experiencing numbness, tingling, and pain in rt leg and thigh.)        7/30/2024     8:55 AM   Additional Questions   Roomed by chriss   Accompanied by alone         7/30/2024     8:55 AM   Patient Reported Additional Medications   Patient reports taking the following new medications none     History of Present  Illness       Reason for visit:  Rt leg pain  Symptom onset:  1-2 weeks ago  Symptoms include:  Numbness, burning, tingling, itching  Symptom intensity:  Severe  Symptom progression:  Worsening  Had these symptoms before:  Yes  Has tried/received treatment for these symptoms:  Yes  Previous treatment was successful:  No  What makes it worse:  Walking  What makes it better:  Staying still    He eats 0-1 servings of fruits and vegetables daily.He consumes 1 sweetened beverage(s) daily.He exercises with enough effort to increase his heart rate 20 to 29 minutes per day.  He exercises with enough effort to increase his heart rate 3 or less days per week.   He is taking medications regularly.  Yobany is a 49-year-old male with a past medical history significant for obesity, hypertension, difficulty attaining erection, and anxiety who presents today with concerns for new onset right thigh pain.  Patient reports that he has had an ongoing concern for nearly 2 decades with numbness and tingling in the anterior aspect of his thigh.  He has had this evaluated a number of times, but has not yet found a successful way to manage it.  He has otherwise not dealt with any concerns for pain, weakness, or reduced range of motion in the extremity until about 2 weeks ago.  About 2 weeks ago he began to experience pain in the anterior lateral aspect of his right thigh.  He describes this as a stabbing and burning pain on top of the numbness and tingling that is constant.  He declines any swelling, weakness, or known trauma to the extremity, and is still able to obtain full range of motion despite discomfort.  He is specifically having a lot of pain with excessive walking, or when laying in specific positions for bed.  He declines any fever, chills, body aches, general fatigue, nausea, vomiting, blurry or double vision, lightheadedness or dizziness, headaches, chest pain, or shortness of breath.  He declines any back pain, gluteal pain,  "or pain in his knee or lower leg.  He has recently had some left-sided knee pain, but declines any experience with the thigh pain that he is now having.  He declines any numbness or tingling in his saddle area, and declines any changes in his bowel or bladder habits.        Review of Systems  Constitutional, HEENT, cardiovascular, pulmonary, gi and gu systems are negative, except as otherwise noted.      Objective    BP (!) 144/92   Pulse 94   Temp 97.5  F (36.4  C) (Tympanic)   Resp 20   Ht 1.702 m (5' 7\")   Wt 107.5 kg (237 lb)   SpO2 99%   BMI 37.12 kg/m    Body mass index is 37.12 kg/m .  Physical Exam   GENERAL: alert and no distress  EYES: Eyes grossly normal to inspection, PERRL and conjunctivae and sclerae normal  HENT: ear canals and TM's normal, nose and mouth without ulcers or lesions  NECK: no adenopathy, no asymmetry, masses, or scars  RESP: lungs clear to auscultation - no rales, rhonchi or wheezes  CV: regular rate and rhythm, normal S1 S2, no S3 or S4, no murmur, click or rub, no peripheral edema  ABDOMEN: soft, nontender, no hepatosplenomegaly, no masses and bowel sounds normal  ORTHO: Hip Exam: Palpation: Tender:   Nontender throughout  Range of Motion:  Full ROM, both hips -pain reproduced in right anterior lateral aspect of right thigh with hip abduction, adduction, and anterior flexion  Strength:  full strength  Special tests:  no crepitation/snapping over central inguinal region, no crepitation/snapping over greater trochanter, IT band tightness  Right, no iliopsoas tightness    Knee Exam: Inspection: No effusion, No quad atrophy  Tender: Nontender throughout  Active Range of Motion: full flexion, full extension, all normal  Strength: Strength intact throughout    SKIN: no suspicious lesions or rashes  NEURO: Normal strength and tone, mentation intact and speech normal    Verito Martinez DNP FNP-C  Family Nurse Practitioner - Same Day Provider  [facility] - " [place]      Signed Electronically by: DASH Sadler CNP

## 2024-07-30 NOTE — PATIENT INSTRUCTIONS
Pain Management  Begin taking medications to manage pain today as we discussed. These will work best to manage your pain when taken around the clock rather than every so often when your pain is worse  -Medrol Dose Jonathan: This is a short course of oral steroids that will help to reduce some of the extra inflammation that is contributing to the pain. Take this in the morning with some food.  Side effects might include insomnia, hunger, and increased emotional irritability.  All of the side effects should resolve once you are done taking the medication.  -Tylenol 325-650mg every 4-6 hours  -Naproxen 500mg twice daily for 2 weeks - do not mixe this with advil, aleve, or ibuprofen. You can begin taking these other medications once you are done with the Naproxen    Apply cold packs to the area for 20min at a time 4 times per day. This will help to reduce swelling and offer some numbing to manage pain. You can also use warm packs to manage pain if this provides better relief.    Movement  It is very important to maintain your normal daily activities as tolerated. Avoiding sitting, laying, standing, or remaining in one position for extended periods of time. Continuing to stretch and move your body will help to encourage healing and keep your muscles loose.    I have attached some exercises to your AVS that I would like you to try for the pain    Next Step  If this plan doesn't seem to be helping after 4-6 weeks then I want you to come back to see me, and we can discuss what the next options might be. Seek emergency medical help If pain becomes unbearable, is not responding to pain medication, or is accompanied by fever, warmth, or loss of function.

## 2024-08-12 DIAGNOSIS — I10 BENIGN ESSENTIAL HYPERTENSION: ICD-10-CM

## 2024-08-13 RX ORDER — LOSARTAN POTASSIUM AND HYDROCHLOROTHIAZIDE 12.5; 5 MG/1; MG/1
1 TABLET ORAL DAILY
Qty: 30 TABLET | Refills: 0 | Status: SHIPPED | OUTPATIENT
Start: 2024-08-13

## 2024-08-27 DIAGNOSIS — I10 BENIGN ESSENTIAL HYPERTENSION: ICD-10-CM

## 2024-08-27 RX ORDER — AMLODIPINE BESYLATE 5 MG/1
5 TABLET ORAL DAILY
Qty: 90 TABLET | Refills: 0 | Status: SHIPPED | OUTPATIENT
Start: 2024-08-27

## 2024-08-28 DIAGNOSIS — I10 BENIGN ESSENTIAL HYPERTENSION: ICD-10-CM

## 2024-08-28 RX ORDER — LOSARTAN POTASSIUM AND HYDROCHLOROTHIAZIDE 12.5; 5 MG/1; MG/1
1 TABLET ORAL DAILY
Qty: 90 TABLET | Refills: 0 | OUTPATIENT
Start: 2024-08-28

## 2024-08-28 NOTE — TELEPHONE ENCOUNTER
[id number] is requesting a 90-day supply.  losartan-hydrochlorothiazide (HYZAAR) 50-12.5 MG tablet     Quantity Requested: 90.0

## 2024-08-31 ENCOUNTER — HEALTH MAINTENANCE LETTER (OUTPATIENT)
Age: 49
End: 2024-08-31

## 2024-10-16 DIAGNOSIS — I10 BENIGN ESSENTIAL HYPERTENSION: ICD-10-CM

## 2024-10-16 RX ORDER — LOSARTAN POTASSIUM AND HYDROCHLOROTHIAZIDE 12.5; 5 MG/1; MG/1
TABLET ORAL
Qty: 30 TABLET | Refills: 0 | Status: SHIPPED | OUTPATIENT
Start: 2024-10-16 | End: 2024-10-30

## 2024-10-30 ENCOUNTER — ORDERS ONLY (AUTO-RELEASED) (OUTPATIENT)
Dept: INTERNAL MEDICINE | Facility: CLINIC | Age: 49
End: 2024-10-30

## 2024-10-30 ENCOUNTER — VIRTUAL VISIT (OUTPATIENT)
Dept: INTERNAL MEDICINE | Facility: CLINIC | Age: 49
End: 2024-10-30
Payer: COMMERCIAL

## 2024-10-30 ENCOUNTER — TELEPHONE (OUTPATIENT)
Dept: INTERNAL MEDICINE | Facility: CLINIC | Age: 49
End: 2024-10-30

## 2024-10-30 DIAGNOSIS — E66.01 CLASS 2 SEVERE OBESITY DUE TO EXCESS CALORIES WITH SERIOUS COMORBIDITY AND BODY MASS INDEX (BMI) OF 37.0 TO 37.9 IN ADULT (H): Primary | ICD-10-CM

## 2024-10-30 DIAGNOSIS — Z12.11 SCREEN FOR COLON CANCER: ICD-10-CM

## 2024-10-30 DIAGNOSIS — E66.812 CLASS 2 SEVERE OBESITY DUE TO EXCESS CALORIES WITH SERIOUS COMORBIDITY AND BODY MASS INDEX (BMI) OF 37.0 TO 37.9 IN ADULT (H): Primary | ICD-10-CM

## 2024-10-30 DIAGNOSIS — R73.03 PRE-DIABETES: ICD-10-CM

## 2024-10-30 DIAGNOSIS — I10 BENIGN ESSENTIAL HYPERTENSION: ICD-10-CM

## 2024-10-30 PROBLEM — F41.1 GAD (GENERALIZED ANXIETY DISORDER): Status: ACTIVE | Noted: 2021-04-14

## 2024-10-30 PROCEDURE — G2211 COMPLEX E/M VISIT ADD ON: HCPCS | Mod: 95 | Performed by: INTERNAL MEDICINE

## 2024-10-30 PROCEDURE — 99204 OFFICE O/P NEW MOD 45 MIN: CPT | Mod: 95 | Performed by: INTERNAL MEDICINE

## 2024-10-30 RX ORDER — LOSARTAN POTASSIUM AND HYDROCHLOROTHIAZIDE 12.5; 5 MG/1; MG/1
1 TABLET ORAL DAILY
Qty: 90 TABLET | Refills: 3 | Status: SHIPPED | OUTPATIENT
Start: 2024-10-30 | End: 2025-10-30

## 2024-10-30 RX ORDER — AMLODIPINE BESYLATE 5 MG/1
5 TABLET ORAL DAILY
Qty: 90 TABLET | Refills: 3 | Status: SHIPPED | OUTPATIENT
Start: 2024-10-30

## 2024-10-30 NOTE — PROGRESS NOTES
OFFICE VISIT--Video    Yobany is a 49 year old male contacting the clinic today via video, who will use the platform: [employer] for the visit.  Phone # for [employer], or if Amwell drops:   Telephone Information:   Mobile [phone]          ASSESSMENT and PLAN:  1. Class 2 severe obesity due to excess calories with serious comorbidity and body mass index (BMI) of 37.0 to 37.9 in adult (H) (Primary)  Discussed GLP-1 agonists.  Discussed pharmacy versus compounding.  Update labs.  No medicines that would increase weight gain  - CBC with Platelets & Differential; Future  - Comprehensive metabolic panel; Future  - TSH; Future  - Lipid Profile; Future  - Hemoglobin A1c; Future  - semaglutide (OZEMPIC) 2 MG/3ML pen; Inject 0.25 mg subcutaneously every 7 days.  Dispense: 1.5 mL; Refill: 11  - Semaglutide-Weight Management (WEGOVY) 0.25 MG/0.5ML pen; Inject 0.25 mg subcutaneously once a week.  Dispense: 2 mL; Refill: 3  - tirzepatide-Weight Management (ZEPBOUND) 2.5 MG/0.5ML prefilled pen; Inject 0.5 mLs (2.5 mg) subcutaneously every 7 days. Whichever is best covered by insurance  Dispense: 2 mL; Refill: 11  - Tirzepatide 2.5 MG/0.5ML SOAJ; Inject 0.5 mLs (2.5 mg) subcutaneously once a week.  Dispense: 2 mL; Refill: 2  - COMPOUNDED NON-CONTROLLED SUBSTANCE (CMPD RX) - PHARMACY TO MIX COMPOUNDED MEDICATION; compound semaglutide 0.25mg solution.  Inject 0.25mg subcutaneously once weekly.  Dispense: 4 each; Refill: 2    2. Pre-diabetes  - Hemoglobin A1c; Future  - semaglutide (OZEMPIC) 2 MG/3ML pen; Inject 0.25 mg subcutaneously every 7 days.  Dispense: 1.5 mL; Refill: 11  - Semaglutide-Weight Management (WEGOVY) 0.25 MG/0.5ML pen; Inject 0.25 mg subcutaneously once a week.  Dispense: 2 mL; Refill: 3  - tirzepatide-Weight Management (ZEPBOUND) 2.5 MG/0.5ML prefilled pen; Inject 0.5 mLs (2.5 mg) subcutaneously every 7 days. Whichever is best covered by insurance  Dispense: 2 mL; Refill: 11  - Tirzepatide 2.5 MG/0.5ML SOAJ; Inject  0.5 mLs (2.5 mg) subcutaneously once a week.  Dispense: 2 mL; Refill: 2  - COMPOUNDED NON-CONTROLLED SUBSTANCE (CMPD RX) - PHARMACY TO MIX COMPOUNDED MEDICATION; compound semaglutide 0.25mg solution.  Inject 0.25mg subcutaneously once weekly.  Dispense: 4 each; Refill: 2    3. Benign essential hypertension  Continue blood pressure medication same while focusing on weight loss  - amLODIPine (NORVASC) 5 MG tablet; Take 1 tablet (5 mg) by mouth daily.  Dispense: 90 tablet; Refill: 3  - losartan-hydrochlorothiazide (HYZAAR) 50-12.5 MG tablet; Take 1 tablet by mouth daily.  Dispense: 90 tablet; Refill: 3  - semaglutide (OZEMPIC) 2 MG/3ML pen; Inject 0.25 mg subcutaneously every 7 days.  Dispense: 1.5 mL; Refill: 11  - Semaglutide-Weight Management (WEGOVY) 0.25 MG/0.5ML pen; Inject 0.25 mg subcutaneously once a week.  Dispense: 2 mL; Refill: 3  - tirzepatide-Weight Management (ZEPBOUND) 2.5 MG/0.5ML prefilled pen; Inject 0.5 mLs (2.5 mg) subcutaneously every 7 days. Whichever is best covered by insurance  Dispense: 2 mL; Refill: 11  - Tirzepatide 2.5 MG/0.5ML SOAJ; Inject 0.5 mLs (2.5 mg) subcutaneously once a week.  Dispense: 2 mL; Refill: 2  - COMPOUNDED NON-CONTROLLED SUBSTANCE (CMPD RX) - PHARMACY TO MIX COMPOUNDED MEDICATION; compound semaglutide 0.25mg solution.  Inject 0.25mg subcutaneously once weekly.  Dispense: 4 each; Refill: 2    4. Screen for colon cancer  Conservative screening is reasonable  - ALANA([employer]); Future       Patient Instructions   GLP-1 agonist options sent    [place] compounding semaglutide sent    Clarify and refill losartan HCTZ and amlodipine    Offered increase in blood pressure medicine but will concentrate on weight loss first    Update labs    Alana            Return in about 4 months (around 2/28/2025) for using a video visit.       CHIEF COMPLAINT:  Chief Complaint   Patient presents with    Medication Request     Pt wants to discuss Ozempic       HISTORY OF PRESENT  "ILLNESS:  Yobany is a 49 year old male contacting the clinic today via video for request to initiate weight loss medication.  Current height 5 foot 7 with weight now 240 pounds BMI 37.  Goal weight below BMI of 30 is 191 pounds.  Reviewed medication.  Needs updated labs    Blood pressure poorly controlled.  Discussed adjustment versus weight loss    Discussed preventative care.  Would prefer conservative screening.  No symptoms      History of Present Illness       Reason for visit:  Medication discussion   He is taking medications regularly.      REVIEW OF SYSTEMS:   Nocturia x 2    Today's PHQ-2 Score:       4/14/2021    12:58 PM   PHQ-2 ( 1999 [employer])   Q1: Little interest or pleasure in doing things 0    Q2: Feeling down, depressed or hopeless 0    PHQ-2 Score 0   PHQ-2 Total Score (12-17 Years)- Positive if 3 or more points; Administer PHQ-A if positive 0   Q1: Little interest or pleasure in doing things Not at all   Q2: Feeling down, depressed or hopeless Not at all   PHQ-2 Score 0       Patient-reported       PFSH:  Social History     Social History Narrative    Not on file       TOBACCO USE:  History   Smoking Status    Every Day    Packs/day: 0.25    Years: 1.00    Types: Cigarettes   Smokeless Tobacco    Never       VITALS:  There were no vitals filed for this visit.  There were no vitals taken for this visit. Estimated body mass index is 37.12 kg/m  as calculated from the following:    Height as of 7/30/24: 1.702 m (5' 7\").    Weight as of 7/30/24: 107.5 kg (237 lb).    PHYSICAL EXAM:  (observations via Video)  Alert and oriented    MEDICATIONS:   Current Outpatient Medications   Medication Sig Dispense Refill    amLODIPine (NORVASC) 5 MG tablet Take 1 tablet (5 mg) by mouth daily. 90 tablet 3    COMPOUNDED NON-CONTROLLED SUBSTANCE (CMPD RX) - PHARMACY TO MIX COMPOUNDED MEDICATION compound semaglutide 0.25mg solution.  Inject 0.25mg subcutaneously once weekly. 4 each 2    losartan-hydrochlorothiazide " "(HYZAAR) 50-12.5 MG tablet Take 1 tablet by mouth daily. 90 tablet 3    semaglutide (OZEMPIC) 2 MG/3ML pen Inject 0.25 mg subcutaneously every 7 days. 1.5 mL 11    Semaglutide-Weight Management (WEGOVY) 0.25 MG/0.5ML pen Inject 0.25 mg subcutaneously once a week. 2 mL 3    Tirzepatide 2.5 MG/0.5ML SOAJ Inject 0.5 mLs (2.5 mg) subcutaneously once a week. 2 mL 2    tirzepatide-Weight Management (ZEPBOUND) 2.5 MG/0.5ML prefilled pen Inject 0.5 mLs (2.5 mg) subcutaneously every 7 days. Whichever is best covered by insurance 2 mL 11       Outside Notes summarized:   Labs, x-rays, cardiology, GI tests reviewed:   Recent Labs   Lab Test 03/06/23  1615      POTASSIUM 3.9   CR 1.23*     No results found for: \"EURGT31NYR\"  Lab Results   Component Value Date    CHOL 180 04/16/2021     New orders:   Orders Placed This Encounter   Procedures    REVIEW OF HEALTH MAINTENANCE PROTOCOL ORDERS    Comprehensive metabolic panel    TSH    Lipid Profile    Hemoglobin A1c    CBC with Platelets & Differential    COLOGUALEESA([employer])       Independent review of:   Patient would like to receive their AVS by [employer]    Video-Visit Details  Type of service:  Video Visit      10/30/2024    11:25 AM   Additional Questions   Roomed by Madi BALLARD RN     Patient has given verbal consent to a Video visit?  Yes  How would you like to obtain your AVS?  [employer]  Will anyone else be joining your video visit, giving supplemental history? No    Originating location (pt location): Work    Distant Location (provider location):  Off-site    Video Start Time: 12:32 PM  Video End time:  1:04 PM  Face to face plus orders: 32 minutes  Documentation time:  3 minutes     The visit lasted a total of 35 minutes    Sarbjit Olivarez MD  Internal Medicine  [facility]     "

## 2024-10-30 NOTE — PROGRESS NOTES
"Yobany is a 49 year old who is being evaluated via a billable video visit.    How would you like to obtain your AVS? Mail a copy  If the video visit is dropped, the invitation should be resent by: Text to cell phone: [phone]  Will anyone else be joining your video visit? No  {If patient encounters technical issues they should call [phone] :538620}    {PROVIDER CHARTING PREFERENCE:786050}    Subjective   Yobany is a 49 year old, presenting for the following health issues:  Medication Request (Pt wants to discuss Ozempic)      10/30/2024    11:25 AM   Additional Questions   Roomed by Madi BALLARD RN     History of Present Illness       Reason for visit:  Medication discussion   He is taking medications regularly.       {SUPERLIST (Optional):917061}  {additonal problems for provider to add (Optional):318012}    {ROS Picklists (Optional):385597}      Objective    Vitals - Patient Reported  Pain Score: No Pain (0)        Physical Exam   {video visit exam brief selected:176121}    {Diagnostic Test Results (Optional):606837}      Video-Visit Details    Type of service:  Video Visit   Originating Location (pt. Location): {video visit patient location:363830::\"Home\"}  {PROVIDER LOCATION On-site should be selected for visits conducted from your clinic location or adjoining [facility] hospital, academic office, or other nearby [facility] building. Off-site should be selected for all other provider locations, including home:963381}  Distant Location (provider location):  {virtual location provider:133295}  Platform used for Video Visit: {Virtual Visit Platforms:019462::\"[employer]\"}  Signed Electronically by: Sarbjit Olivarez MD  {Email feedback regarding this note to [email or web address]   :944707}  "

## 2024-10-30 NOTE — PATIENT INSTRUCTIONS
GLP-1 agonist options sent    [place] compounding semaglutide sent    Clarify and refill losartan HCTZ and amlodipine    Offered increase in blood pressure medicine but will concentrate on weight loss first    Update labs    Harini

## 2024-10-31 NOTE — TELEPHONE ENCOUNTER
Central Prior Authorization Team   Phone: [phone]    PA Initiation    Medication: Mounjaro 2.5MG/0.5ML auto-injectors  Insurance Company: [employer] - Phone [phone] Fax [phone]  Pharmacy Filling the Rx: [id number] - [place], [place] - [facility] AT [facility] [address]  Filling Pharmacy Phone: [phone]  Filling Pharmacy Fax:    Start Date: 10/31/2024

## 2024-11-01 ENCOUNTER — TELEPHONE (OUTPATIENT)
Dept: INTERNAL MEDICINE | Facility: CLINIC | Age: 49
End: 2024-11-01
Payer: COMMERCIAL

## 2024-11-01 NOTE — TELEPHONE ENCOUNTER
PRIOR AUTHORIZATION DENIED    Medication: Mounjaro 2.5MG/0.5ML auto-injectors    Denial Date: 11/1/2024    Denial Rational: Medication is only covered if being prescribed for Type 2 Diabetes.  It is not covered for any other diagnosis.        Appeal Information: Review the plan's reasons for denial listed above. Please utilize that information to complete letter and provide specific, detailed clinical information/rationale of your patient's health status to address their denial reasons.

## 2024-11-01 NOTE — TELEPHONE ENCOUNTER
----- Message from Sarbjit Olivarez sent at 10/30/2024 12:52 PM CDT -----  Please schedule lab appointment

## 2024-11-12 ENCOUNTER — TELEPHONE (OUTPATIENT)
Dept: FAMILY MEDICINE | Facility: CLINIC | Age: 49
End: 2024-11-12

## 2024-11-12 NOTE — TELEPHONE ENCOUNTER
Please start prior authorization.    Key - [id number]     Disp Refills Start End TERRY   Semaglutide-Weight Management (WEGOVY) 0.25 MG/0.5ML pen 2 mL 3 10/30/2024 10/30/2025 No   Sig - Route: Inject 0.25 mg subcutaneously once a week. - Subcutaneous   Sent to pharmacy as: Semaglutide-Weight Management 0.25 MG/0.5ML Subcutaneous Solution Auto-injector (WEGOVY)   Class: E-Prescribe   Notes to Pharmacy: Whichever is best covered by insurance   Order: [id number]   E-Prescribing Status: Receipt confirmed by pharmacy (10/30/2024 12:53 PM CDT)     Other Panel Orders    Outpatient Medications     Disp Refills Start End TERRY   semaglutide (OZEMPIC) 2 MG/3ML pen 1.5 mL 11 10/30/2024 10/30/2025 No   Sig - Route: Inject 0.25 mg subcutaneously every 7 days. - Subcutaneous   Sent to pharmacy as: Semaglutide(0.25 or 0.5MG/DOS) 2 MG/3ML Solution Pen-injector (OZEMPIC)   Class: E-Prescribe   Notes to Pharmacy: Whichever is best covered by insurance   Order: [id number]   E-Prescribing Status: Receipt confirmed by pharmacy (10/30/2024  1:35 PM CDT)   Prior authorization: Denied   tirzepatide-Weight Management (ZEPBOUND) 2.5 MG/0.5ML prefilled pen 2 mL 11 10/30/2024 10/30/2025 No   Sig - Route: Inject 0.5 mLs (2.5 mg) subcutaneously every 7 days. Whichever is best covered by insurance - Subcutaneous   Sent to pharmacy as: Tirzepatide-Weight Management 2.5 MG/0.5ML Subcutaneous Solution Auto-injector (ZEPBOUND)   Class: E-Prescribe   Order: [id number]   E-Prescribing Status: Receipt confirmed by pharmacy (10/30/2024 12:53 PM CDT)   Tirzepatide 2.5 MG/0.5ML SOAJ 2 mL 2 10/30/2024 1/28/2025 No   Sig - Route: Inject 0.5 mLs (2.5 mg) subcutaneously once a week. - Subcutaneous   Sent to pharmacy as: Tirzepatide 2.5 MG/0.5ML Subcutaneous Solution Auto-injector   Class: E-Prescribe   Order: [id number]   E-Prescribing Status: Receipt confirmed by pharmacy (10/30/2024  1:05 PM CDT)   Prior authorization: Closed     Printout Tracking    External Result  Report     Panel Details for Medication Panel    Outpatient Medication Quantity Refills Start End   semaglutide (OZEMPIC) 2 MG/3ML pen 1.5 mL 11 10/30/2024 10/30/2025   Sig:   Inject 0.25 mg subcutaneously every 7 days.     Route:   Subcutaneous     Note to Pharmacy:   Whichever is best covered by insurance     Class:   E-Prescribe     Semaglutide-Weight Management (WEGOVY) 0.25 MG/0.5ML pen 2 mL 3 10/30/2024 10/30/2025   Sig:   Inject 0.25 mg subcutaneously once a week.     Route:   Subcutaneous     Note to Pharmacy:   Whichever is best covered by insurance     Class:   E-Prescribe     tirzepatide-Weight Management (ZEPBOUND) 2.5 MG/0.5ML prefilled pen 2 mL 11 10/30/2024 10/30/2025   Sig:   Inject 0.5 mLs (2.5 mg) subcutaneously every 7 days. Whichever is best covered by insurance     Route:   Subcutaneous     Class:   E-Prescribe     Tirzepatide 2.5 MG/0.5ML SOAJ 2 mL 2 10/30/2024 1/28/2025   Sig:   Inject 0.5 mLs (2.5 mg) subcutaneously once a week.     Route:   Subcutaneous     Class:   E-Prescribe       Pharmacy    [id number] - [place], [place] - [address] AT [facility] [address]     Associated Diagnoses    Class 2 severe obesity due to excess calories with serious comorbidity and body mass index (BMI) of 37.0 to 37.9 in adult (H) [E66.812, E66.01, Z68.37]  - Primary      Pre-diabetes [R73.03]      Benign essential hypertension [I10]        Source Order Set    Order Set Name Order ID    [id number]       Prescribing Provider's NPI: [id number]  Sarbjit Olivarez

## 2024-11-15 NOTE — TELEPHONE ENCOUNTER
Central Prior Authorization Team   Phone: [phone]    PA Initiation    Medication: Semaglutide-Weight Management (WEGOVY) 0.25 MG/0.5ML pen   Insurance Company: [employer] - Phone [phone] Fax [phone]  Pharmacy Filling the Rx: [id number] - [place], [place] - [facility] [address] AT [facility] [address]  Filling Pharmacy Phone: [phone]  Filling Pharmacy Fax:    Start Date: 11/15/2024

## 2024-11-18 NOTE — TELEPHONE ENCOUNTER
PRIOR AUTHORIZATION DENIED    Medication: Semaglutide-Weight Management (WEGOVY) 0.25 MG/0.5ML pen    Denial Date: 11/18/2024    Denial Rational: Medication is a plan exclusion         Appeal Information:  Drug exclusions can not be appealed.  This medication will not be covered by the prescription plan for any reason. The drug is not on formulary and there are no loopholes to gaining approval.